# Patient Record
Sex: MALE | Race: OTHER | HISPANIC OR LATINO | Employment: FULL TIME | URBAN - METROPOLITAN AREA
[De-identification: names, ages, dates, MRNs, and addresses within clinical notes are randomized per-mention and may not be internally consistent; named-entity substitution may affect disease eponyms.]

---

## 2017-02-10 ENCOUNTER — ALLSCRIPTS OFFICE VISIT (OUTPATIENT)
Dept: OTHER | Facility: OTHER | Age: 57
End: 2017-02-10

## 2017-02-11 ENCOUNTER — GENERIC CONVERSION - ENCOUNTER (OUTPATIENT)
Dept: OTHER | Facility: OTHER | Age: 57
End: 2017-02-11

## 2017-02-20 LAB
ALBUMIN SERPL BCP-MCNC: 4.2 G/DL
ALP SERPL-CCNC: 72 U/L
ALT SERPL W P-5'-P-CCNC: 22 U/L
AST SERPL W P-5'-P-CCNC: 12 U/L
BILIRUB SERPL-MCNC: 0.5 MG/DL
BUN SERPL-MCNC: 20 MG/DL
CALCIUM SERPL-MCNC: 9.1 MG/DL
CHLORIDE SERPL-SCNC: 106 MMOL/L
CHOLEST SERPL-MCNC: 230 MG/DL
CHOLEST/HDLC SERPL: 5.61 {RATIO}
CO2 SERPL-SCNC: 28 MMOL/L
CREAT SERPL-MCNC: 0.86 MG/DL
DEPRECATED RDW RBC AUTO: 14.9 FL
GLUCOSE (HISTORICAL): 87
HCT VFR BLD AUTO: 48.3 %
HDLC SERPL-MCNC: 41 MG/DL
HGB BLD-MCNC: 16 G/DL
LDL CHOLESTEROL (HISTORICAL): 164
MCH RBC QN AUTO: 27.3 PG
MCHC RBC AUTO-ENTMCNC: 33.2 G/DL
MCV RBC AUTO: 82 FL
NON-HDL-CHOL (CHOL-HDL) (HISTORICAL): 189
PLATELET # BLD AUTO: 217 10*3/UL
PMV BLD AUTO: 10.1 FL
POTASSIUM SERPL-SCNC: 5.3 MMOL/L
RBC # BLD AUTO: 5.87 10*6/UL
SODIUM SERPL-SCNC: 142 MMOL/L
TOTAL PROTEIN (HISTORICAL): 7.2
TRIGL SERPL-MCNC: 124 MG/DL
TSH SERPL DL<=0.05 MIU/L-ACNC: 0.81 M[IU]/L
WBC # BLD AUTO: 10.5 10*3/UL

## 2017-04-03 ENCOUNTER — GENERIC CONVERSION - ENCOUNTER (OUTPATIENT)
Dept: OTHER | Facility: OTHER | Age: 57
End: 2017-04-03

## 2018-01-14 VITALS
WEIGHT: 157.19 LBS | TEMPERATURE: 98 F | HEIGHT: 62 IN | RESPIRATION RATE: 16 BRPM | OXYGEN SATURATION: 96 % | HEART RATE: 72 BPM | BODY MASS INDEX: 28.93 KG/M2 | DIASTOLIC BLOOD PRESSURE: 80 MMHG | SYSTOLIC BLOOD PRESSURE: 146 MMHG

## 2018-01-16 NOTE — RESULT NOTES
Message   Please call patient  His BW appeared stable except for his Cholesterol levels  All of his levels were very high  At this time, due to the high elevation and his Hx of TIA, He needs to start Cholesterol medication  I will call in a medication to his pharmacy  Pts primary language is [place], please have MA call patient that can communicate with him   thank you     Verified Results  (Q) CBC (INCLUDES DIFF/PLT) (REFL) [id number] 07:58AM Virgie Bond     Test Name Result Flag Reference   WHITE BLOOD CELL COUNT 8 9 Thousand/uL  3 8-10 8   RED BLOOD CELL COUNT 5 66 Million/uL  4 20-5 80   HEMOGLOBIN 15 0 g/dL  13 2-17 1   HEMATOCRIT 47 3 %  38 5-50 0   MCV 83 5 fL  80 0-100 0   MCH 26 5 pg L 27 0-33 0   MCHC 31 7 g/dL L 32 0-36 0   RDW 15 2 % H 11 0-15 0   PLATELET COUNT 762 Thousand/uL  140-400   MPV 10 6 fL  7 5-11 5   ABSOLUTE NEUTROPHILS 6150 cells/uL  0225-0564   ABSOLUTE LYMPHOCYTES 1994 cells/uL  850-3900   ABSOLUTE MONOCYTES 481 cells/uL  200-950   ABSOLUTE EOSINOPHILS 231 cells/uL     ABSOLUTE BASOPHILS 45 cells/uL  0-200   NEUTROPHILS 69 1 %     LYMPHOCYTES 22 4 %     MONOCYTES 5 4 %     EOSINOPHILS 2 6 %     BASOPHILS 0 5 %       (Q) COMPREHENSIVE METABOLIC PNL W/ADJUSTED CALCIUM [id number] 07:58AM Virgie Bond     Test Name Result Flag Reference   GLUCOSE 86 mg/dL  65-99   Fasting reference interval   UREA NITROGEN (BUN) 14 mg/dL  7-25   CREATININE 0 82 mg/dL  0 70-1 33   For patients >52years of age, the reference limit  for Creatinine is approximately 13% higher for people  identified as -American  eGFR NON-AFR   AMERICAN 98 mL/min/1 73m2  > OR = 60   eGFR AFRICAN AMERICAN 114 mL/min/1 73m2  > OR = 60   BUN/CREATININE RATIO   3-59   NOT APPLICABLE (calc)   SODIUM 138 mmol/L  135-146   POTASSIUM 4 9 mmol/L  3 5-5 3   CHLORIDE 104 mmol/L     CARBON DIOXIDE 26 mmol/L  19-30   CALCIUM 9 3 mg/dL  8 6-10 3   CALCIUM (ADJUSTED FOR$ALBUMIN) 9 5 mg/dL (calc)  8 6-10 2 PROTEIN, TOTAL 6 7 g/dL  6 1-8 1   ALBUMIN 4 1 g/dL  3 6-5 1   GLOBULIN 2 6 g/dL (calc)  1 9-3 7   ALBUMIN/GLOBULIN RATIO 1 6 (calc)  1 0-2 5   BILIRUBIN, TOTAL 0 5 mg/dL  0 2-1 2   ALKALINE PHOSPHATASE 75 U/L     AST 19 U/L  10-35   ALT 12 U/L  9-46     (Q) LIPID PANEL WITH REFLEX TO DIRECT LDL [id number] 07:58AM Virgie Bond     Test Name Result Flag Reference   CHOLESTEROL, TOTAL 276 mg/dL H 125-200   HDL CHOLESTEROL 34 mg/dL L > OR = 40   TRIGLICERIDES 320 mg/dL  <150   LDL-CHOLESTEROL 218 mg/dL (calc) H <130   LDL-C levels > or = 190 mg/dL may indicate familial   hypercholesterolemia (FH)  Clinical assessment and   measurement of blood lipid levels should be considered  for all first degree relatives of patients with an   FH diagnosis  J of Clinical Lipidology 5:S1-S8 2011  Desirable range <100 mg/dL for patients with CHD or  diabetes and <70 mg/dL for diabetic patients with  known heart disease  CHOL/HDLC RATIO 8 1 (calc) H < OR = 5 0   NON HDL CHOLESTEROL 242 mg/dL (calc) H    Target for non-HDL cholesterol is 30 mg/dL higher than   LDL cholesterol target       (Q) TSH, 3RD GENERATION W/REFLEX TO FT4 [id number] 07:58AM Virgie Bond     Test Name Result Flag Reference   TSH W/REFLEX TO FT4 0 62 mIU/L  0 40-4 50

## 2018-12-07 ENCOUNTER — OFFICE VISIT (OUTPATIENT)
Dept: FAMILY MEDICINE CLINIC | Facility: CLINIC | Age: 58
End: 2018-12-07
Payer: COMMERCIAL

## 2018-12-07 VITALS
SYSTOLIC BLOOD PRESSURE: 142 MMHG | HEART RATE: 85 BPM | TEMPERATURE: 97.9 F | OXYGEN SATURATION: 96 % | RESPIRATION RATE: 14 BRPM | HEIGHT: 64 IN | BODY MASS INDEX: 25.32 KG/M2 | WEIGHT: 148.3 LBS | DIASTOLIC BLOOD PRESSURE: 80 MMHG

## 2018-12-07 DIAGNOSIS — N50.3 CYST OF EPIDIDYMIS: Primary | ICD-10-CM

## 2018-12-07 PROCEDURE — 99213 OFFICE O/P EST LOW 20 MIN: CPT | Performed by: FAMILY MEDICINE

## 2018-12-07 PROCEDURE — 3008F BODY MASS INDEX DOCD: CPT | Performed by: FAMILY MEDICINE

## 2018-12-07 RX ORDER — ATORVASTATIN CALCIUM 80 MG/1
80 TABLET, FILM COATED ORAL
COMMUNITY
End: 2020-01-08 | Stop reason: SDUPTHER

## 2018-12-07 RX ORDER — SIMVASTATIN 40 MG
1 TABLET ORAL
COMMUNITY
Start: 2016-02-11 | End: 2019-02-26 | Stop reason: ALTCHOICE

## 2018-12-07 RX ORDER — CYANOCOBALAMIN (VITAMIN B-12) 2000 MCG
1 TABLET ORAL DAILY
COMMUNITY
Start: 2016-08-05

## 2018-12-07 NOTE — ASSESSMENT & PLAN NOTE
Patient has a ?mass on his R testicle x a few years  In the past few mos, mass has increased in size and become painful  No pain w/ urination  No fevers/chills  Upon examination, patient exhibits soft mildly tender mass behind right testicle  Lesion is most likely a benign epididymal appendage  But I would like to confirm with scrotal ultrasound    Will call with results

## 2018-12-11 ENCOUNTER — HOSPITAL ENCOUNTER (OUTPATIENT)
Dept: ULTRASOUND IMAGING | Facility: MEDICAL CENTER | Age: 58
Discharge: HOME | End: 2018-12-11
Payer: COMMERCIAL

## 2018-12-11 DIAGNOSIS — N50.3 CYST OF EPIDIDYMIS: ICD-10-CM

## 2018-12-11 PROCEDURE — 76870 US EXAM SCROTUM: CPT

## 2018-12-20 ENCOUNTER — TELEPHONE (OUTPATIENT)
Dept: FAMILY MEDICINE CLINIC | Facility: CLINIC | Age: 58
End: 2018-12-20

## 2018-12-20 NOTE — TELEPHONE ENCOUNTER
Pt's niece called into the office and stated that pt would like a referral to a Neurologist  I did advise the the niece that you are out of the office till next week, and we will get back to her once we hear from you that the referral was placed  Thank you!

## 2018-12-20 NOTE — TELEPHONE ENCOUNTER
Call niece, does her uncle want a referral to a neurologist? (because I think she means a urologist)  Please clarify

## 2018-12-24 DIAGNOSIS — N50.3 CYST OF EPIDIDYMIS: Primary | ICD-10-CM

## 2018-12-28 NOTE — TELEPHONE ENCOUNTER
LM on pt's [facility] cellphone (consent ok) with Urology information   Will be mailing referral

## 2019-01-04 ENCOUNTER — TELEPHONE (OUTPATIENT)
Dept: FAMILY MEDICINE CLINIC | Facility: CLINIC | Age: 59
End: 2019-01-04

## 2019-01-04 NOTE — TELEPHONE ENCOUNTER
Pt's niece called, pt got a urology referral to a Dr in [place]  Is there someone closer? Call Aidan Herbert at [phone]

## 2019-01-11 ENCOUNTER — TELEPHONE (OUTPATIENT)
Dept: UROLOGY | Facility: MEDICAL CENTER | Age: 59
End: 2019-01-11

## 2019-01-11 NOTE — TELEPHONE ENCOUNTER
Reason for appointment/Complaint/Diagnosis : Cyst of epididymis     Insurance:     History of Cancer? PE                     If yes, what kind? N/A    Previous urologist?    No                  Records requested/where? No    Outside testing/where? No    Location Preference for office visit?  [place]

## 2019-01-15 ENCOUNTER — OFFICE VISIT (OUTPATIENT)
Dept: UROLOGY | Facility: MEDICAL CENTER | Age: 59
End: 2019-01-15
Payer: COMMERCIAL

## 2019-01-15 VITALS
HEART RATE: 82 BPM | SYSTOLIC BLOOD PRESSURE: 136 MMHG | BODY MASS INDEX: 28.34 KG/M2 | DIASTOLIC BLOOD PRESSURE: 82 MMHG | HEIGHT: 62 IN | WEIGHT: 154 LBS

## 2019-01-15 DIAGNOSIS — N40.0 BPH WITHOUT OBSTRUCTION/LOWER URINARY TRACT SYMPTOMS: ICD-10-CM

## 2019-01-15 DIAGNOSIS — N50.3 CYST OF EPIDIDYMIS: Primary | Chronic | ICD-10-CM

## 2019-01-15 PROCEDURE — 99244 OFF/OP CNSLTJ NEW/EST MOD 40: CPT | Performed by: UROLOGY

## 2019-01-15 NOTE — PROGRESS NOTES
Assessment/Plan:  1  Right-sided epididymal cysts-no intervention necessary at this time  2  BPH without obstruction-check PSA and CO MP  No problem-specific Assessment & Plan notes found for this encounter  Diagnoses and all orders for this visit:    Cyst of epididymis  Comments:  Right-sided    BPH without obstruction/lower urinary tract symptoms  -     PSA Total, Diagnostic; Future  -     Comprehensive metabolic panel; Future          Subjective:      Patient ID: Sae Mcgraw is a 59 y o  male  Chief complaint:  Epididymal cysts on right    History of present illness:  68-year-old  gentleman who presents with a mass involving his right epididymis  Patient underwent testicular ultrasound ordered by his PCP which revealed multiple right epididymal cysts  The cysts are not tender nor growing quickly at the present time their classified is asymptomatic  With regard to the patient's voiding pattern he notes never having had a PSA or prostate examination  We did discuss PSA screening and the patient agrees to this in view of current AUA guidelines  The patient denies dysuria frequency urgency incontinence an appreciable nocturia  The following portions of the patient's history were reviewed and updated as appropriate: allergies, current medications, past family history, past medical history, past social history, past surgical history and problem list     Review of Systems   All other systems reviewed and are negative  Objective:      /82 (BP Location: Left arm, Patient Position: Sitting, Cuff Size: Adult)   Pulse 82   Ht 5' 2" (1 575 m)   Wt 69 9 kg (154 lb)   BMI 28 17 kg/m²          Physical Exam   Constitutional: He appears well-developed and well-nourished  HENT:   Head: Normocephalic and atraumatic  Eyes: Conjunctivae and EOM are normal    Neck: Normal range of motion  Neck supple  Pulmonary/Chest: Effort normal  No respiratory distress   He has no wheezes  Abdominal: Soft  He exhibits no distension  There is no tenderness  Genitourinary:   Genitourinary Comments: Phallus normal uncircumcised without cutaneous lesions  Meatus patent normally placed  Digital rectal examination revealed a normal anal verge normal anal sphincter tone no palpable rectal masses and a 20 g a nodular nontender prostate  Left hemiscrotum was normal with normal testicle and adnexa without masses or tenderness  The right hemiscrotum reveals a normal right testis without masses or tenderness  The the right epididymis is full and is palpably consistent with multiple epididymal cysts as seen on ultrasound  Vitals reviewed

## 2019-01-15 NOTE — LETTER
January 15, 2019     Yuri Giron DO  [address] [facility]  [address] [facility]    Patient: Lorenza Holguin   YOB: 1960   Date of Visit: 1/15/2019       Dear Dr Corey Chen:    Thank you for referring Lorenza Holguin to me for evaluation  Below are my notes for this consultation  If you have questions, please do not hesitate to call me  I look forward to following your patient along with you  Sincerely,        Lucia Whitley MD        CC: No Recipients  Lucia Whitley MD  1/15/2019  3:20 PM  Sign at close encounter  Assessment/Plan:  1  Right-sided epididymal cysts-no intervention necessary at this time  2  BPH without obstruction-check PSA and CO MP  No problem-specific Assessment & Plan notes found for this encounter  Diagnoses and all orders for this visit:    Cyst of epididymis  Comments:  Right-sided    BPH without obstruction/lower urinary tract symptoms  -     PSA Total, Diagnostic; Future  -     Comprehensive metabolic panel; Future          Subjective:      Patient ID: Lorenza Holguin is a 59 y o  male  Chief complaint:  Epididymal cysts on right    History of present illness:  23-year-old  gentleman who presents with a mass involving his right epididymis  Patient underwent testicular ultrasound ordered by his PCP which revealed multiple right epididymal cysts  The cysts are not tender nor growing quickly at the present time their classified is asymptomatic  With regard to the patient's voiding pattern he notes never having had a PSA or prostate examination  We did discuss PSA screening and the patient agrees to this in view of current AUA guidelines  The patient denies dysuria frequency urgency incontinence an appreciable nocturia          The following portions of the patient's history were reviewed and updated as appropriate: allergies, current medications, past family history, past medical history, past social history, past surgical history and problem list     Review of Systems   All other systems reviewed and are negative  Objective:      /82 (BP Location: Left arm, Patient Position: Sitting, Cuff Size: Adult)   Pulse 82   Ht 5' 2" (1 575 m)   Wt 69 9 kg (154 lb)   BMI 28 17 kg/m²           Physical Exam   Constitutional: He appears well-developed and well-nourished  HENT:   Head: Normocephalic and atraumatic  Eyes: Conjunctivae and EOM are normal    Neck: Normal range of motion  Neck supple  Pulmonary/Chest: Effort normal  No respiratory distress  He has no wheezes  Abdominal: Soft  He exhibits no distension  There is no tenderness  Genitourinary:   Genitourinary Comments: Phallus normal uncircumcised without cutaneous lesions  Meatus patent normally placed  Digital rectal examination revealed a normal anal verge normal anal sphincter tone no palpable rectal masses and a 20 g a nodular nontender prostate  Left hemiscrotum was normal with normal testicle and adnexa without masses or tenderness  The right hemiscrotum reveals a normal right testis without masses or tenderness  The the right epididymis is full and is palpably consistent with multiple epididymal cysts as seen on ultrasound  Vitals reviewed

## 2019-01-26 ENCOUNTER — APPOINTMENT (OUTPATIENT)
Dept: LAB | Facility: MEDICAL CENTER | Age: 59
End: 2019-01-26
Attending: UROLOGY
Payer: COMMERCIAL

## 2019-01-26 DIAGNOSIS — N40.0 BPH WITHOUT OBSTRUCTION/LOWER URINARY TRACT SYMPTOMS: ICD-10-CM

## 2019-01-26 LAB
ALBUMIN SERPL BCP-MCNC: 4.3 G/DL (ref 3.5–5)
ALP SERPL-CCNC: 83 U/L (ref 46–116)
ALT SERPL W P-5'-P-CCNC: 26 U/L (ref 12–78)
ANION GAP SERPL CALCULATED.3IONS-SCNC: 5 MMOL/L (ref 4–13)
AST SERPL W P-5'-P-CCNC: 16 U/L (ref 5–45)
BILIRUB SERPL-MCNC: 0.26 MG/DL (ref 0.2–1)
BUN SERPL-MCNC: 15 MG/DL (ref 5–25)
CALCIUM SERPL-MCNC: 8.8 MG/DL (ref 8.3–10.1)
CHLORIDE SERPL-SCNC: 107 MMOL/L (ref 100–108)
CO2 SERPL-SCNC: 26 MMOL/L (ref 21–32)
CREAT SERPL-MCNC: 0.83 MG/DL (ref 0.6–1.3)
GFR SERPL CREATININE-BSD FRML MDRD: 96 ML/MIN/1.73SQ M
GLUCOSE P FAST SERPL-MCNC: 101 MG/DL (ref 65–99)
POTASSIUM SERPL-SCNC: 4.3 MMOL/L (ref 3.5–5.3)
PROT SERPL-MCNC: 7.7 G/DL (ref 6.4–8.2)
PSA SERPL-MCNC: 1.4 NG/ML (ref 0–4)
SODIUM SERPL-SCNC: 138 MMOL/L (ref 136–145)

## 2019-01-26 PROCEDURE — 84153 ASSAY OF PSA TOTAL: CPT

## 2019-01-26 PROCEDURE — 80053 COMPREHEN METABOLIC PANEL: CPT

## 2019-01-26 PROCEDURE — 36415 COLL VENOUS BLD VENIPUNCTURE: CPT

## 2019-02-26 ENCOUNTER — OFFICE VISIT (OUTPATIENT)
Dept: UROLOGY | Facility: MEDICAL CENTER | Age: 59
End: 2019-02-26
Payer: COMMERCIAL

## 2019-02-26 VITALS
WEIGHT: 154 LBS | SYSTOLIC BLOOD PRESSURE: 152 MMHG | DIASTOLIC BLOOD PRESSURE: 82 MMHG | HEIGHT: 62 IN | BODY MASS INDEX: 28.34 KG/M2 | HEART RATE: 91 BPM

## 2019-02-26 DIAGNOSIS — N50.3 CYST OF EPIDIDYMIS: Primary | ICD-10-CM

## 2019-02-26 DIAGNOSIS — N40.0 BENIGN PROSTATIC HYPERPLASIA WITHOUT LOWER URINARY TRACT SYMPTOMS: ICD-10-CM

## 2019-02-26 LAB
SL AMB  POCT GLUCOSE, UA: NORMAL
SL AMB LEUKOCYTE ESTERASE,UA: NORMAL
SL AMB POCT BILIRUBIN,UA: NORMAL
SL AMB POCT BLOOD,UA: NORMAL
SL AMB POCT CLARITY,UA: CLEAR
SL AMB POCT COLOR,UA: YELLOW
SL AMB POCT KETONES,UA: NORMAL
SL AMB POCT NITRITE,UA: NORMAL
SL AMB POCT PH,UA: 8
SL AMB POCT SPECIFIC GRAVITY,UA: 1.02
SL AMB POCT URINE PROTEIN: NORMAL
SL AMB POCT UROBILINOGEN: 1

## 2019-02-26 PROCEDURE — 99214 OFFICE O/P EST MOD 30 MIN: CPT | Performed by: UROLOGY

## 2019-02-26 PROCEDURE — 81003 URINALYSIS AUTO W/O SCOPE: CPT | Performed by: UROLOGY

## 2019-02-26 NOTE — PROGRESS NOTES
Assessment/Plan:  1  BPH without obstructive symptoms-repeat PSA and AMBER in 1 year  2  Right epididymal cysts-no intervention at this time  No problem-specific Assessment & Plan notes found for this encounter  Diagnoses and all orders for this visit:    Cyst of epididymis    Benign prostatic hyperplasia without lower urinary tract symptoms  -     POCT urine dip auto non-scope  -     PSA Total, Diagnostic; Future  -     Comprehensive metabolic panel; Future          Subjective:      Patient ID: Milton Pollard is a 59 y o  male  Chief complaint:  Prostate examination and discussion of epididymal cyst    History of present illness: This is a 76-year-old male who presented previously for evaluation of a right epididymal cyst   The patient has no evidence of solid epididymal or testicular masses  He is voiding adequately with AUA symptom score of only 3  The patient denies dysuria obstructive irritative voiding symptoms significant urinary frequency nocturia urgency or incontinence  The patient's PSA is well within normal limits a 1 4  AMBER performed last time revealed no suspicion of prostatic      The following portions of the patient's history were reviewed and updated as appropriate: allergies, current medications, past family history, past medical history, past social history, past surgical history and problem list     Review of Systems   Constitutional: Negative  Respiratory: Negative  Cardiovascular: Negative  Genitourinary: Negative  Musculoskeletal: Negative  Allergic/Immunologic: Negative  Neurological: Negative  Hematological: Negative  Psychiatric/Behavioral: Negative  Objective:      /82   Pulse 91   Ht 5' 2" (1 575 m)   Wt 69 9 kg (154 lb)   BMI 28 17 kg/m²          Physical Exam   Constitutional: He is oriented to person, place, and time  He appears well-nourished  No distress  HENT:   Head: Atraumatic  Neck: Neck supple  Pulmonary/Chest: Effort normal  No respiratory distress  Abdominal: Soft  Neurological: He is alert and oriented to person, place, and time  Psychiatric: He has a normal mood and affect  His behavior is normal  Judgment and thought content normal    Vitals reviewed

## 2019-12-09 ENCOUNTER — TELEPHONE (OUTPATIENT)
Dept: FAMILY MEDICINE CLINIC | Facility: CLINIC | Age: 59
End: 2019-12-09

## 2019-12-26 ENCOUNTER — OFFICE VISIT (OUTPATIENT)
Dept: FAMILY MEDICINE CLINIC | Facility: CLINIC | Age: 59
End: 2019-12-26
Payer: COMMERCIAL

## 2019-12-26 ENCOUNTER — APPOINTMENT (OUTPATIENT)
Dept: RADIOLOGY | Facility: CLINIC | Age: 59
End: 2019-12-26
Payer: COMMERCIAL

## 2019-12-26 VITALS
BODY MASS INDEX: 24.8 KG/M2 | HEART RATE: 83 BPM | WEIGHT: 140 LBS | HEIGHT: 63 IN | RESPIRATION RATE: 17 BRPM | SYSTOLIC BLOOD PRESSURE: 136 MMHG | TEMPERATURE: 97.8 F | DIASTOLIC BLOOD PRESSURE: 70 MMHG | OXYGEN SATURATION: 97 %

## 2019-12-26 DIAGNOSIS — Z12.11 SCREENING FOR COLON CANCER: ICD-10-CM

## 2019-12-26 DIAGNOSIS — M25.541 ARTHRALGIA OF BOTH HANDS: ICD-10-CM

## 2019-12-26 DIAGNOSIS — M54.6 ACUTE LEFT-SIDED THORACIC BACK PAIN: ICD-10-CM

## 2019-12-26 DIAGNOSIS — Z13.1 SCREENING FOR DIABETES MELLITUS: ICD-10-CM

## 2019-12-26 DIAGNOSIS — Z13.0 SCREENING FOR DEFICIENCY ANEMIA: ICD-10-CM

## 2019-12-26 DIAGNOSIS — M25.542 ARTHRALGIA OF BOTH HANDS: ICD-10-CM

## 2019-12-26 DIAGNOSIS — M54.6 ACUTE LEFT-SIDED THORACIC BACK PAIN: Primary | ICD-10-CM

## 2019-12-26 DIAGNOSIS — Z13.29 SCREENING FOR THYROID DISORDER: ICD-10-CM

## 2019-12-26 DIAGNOSIS — M54.2 CHRONIC NECK PAIN: ICD-10-CM

## 2019-12-26 DIAGNOSIS — G89.29 CHRONIC NECK PAIN: ICD-10-CM

## 2019-12-26 DIAGNOSIS — Z13.220 SCREENING FOR CHOLESTEROL LEVEL: ICD-10-CM

## 2019-12-26 PROCEDURE — 72072 X-RAY EXAM THORAC SPINE 3VWS: CPT

## 2019-12-26 PROCEDURE — 3008F BODY MASS INDEX DOCD: CPT | Performed by: FAMILY MEDICINE

## 2019-12-26 PROCEDURE — 99214 OFFICE O/P EST MOD 30 MIN: CPT | Performed by: FAMILY MEDICINE

## 2019-12-26 PROCEDURE — 72050 X-RAY EXAM NECK SPINE 4/5VWS: CPT

## 2019-12-26 PROCEDURE — 73130 X-RAY EXAM OF HAND: CPT

## 2019-12-26 RX ORDER — MELOXICAM 15 MG/1
15 TABLET ORAL DAILY
Qty: 30 TABLET | Refills: 0 | Status: SHIPPED | OUTPATIENT
Start: 2019-12-26 | End: 2020-01-08 | Stop reason: SDUPTHER

## 2019-12-26 NOTE — ASSESSMENT & PLAN NOTE
Also, b/l hand pain  Morning stiffness x 1 year  No other joint pains  Will sent for x-rays bilateral hands  Will also order arthritis lab panel    Will call with results

## 2019-12-26 NOTE — PROGRESS NOTES
[facility]      NAME: Dania Hubbard  AGE: 62 y o  SEX: male  : 1960   MRN: [id number]    DATE: 2019  TIME: 4:32 PM    Assessment and Plan     Problem List Items Addressed This Visit     Acute left-sided thoracic back pain - Primary     3 week hx of L thoracic back pain  No radiation  Exam today reveals point tenderness left paravertebral musculature  Will sent for x-rays of thoracic spine  Rx given for meloxicam 15 mg daily as needed  Will call with results  Possible referral to physical therapy             Relevant Medications    meloxicam (MOBIC) 15 mg tablet    Other Relevant Orders    XR spine thoracic 3 vw    Chronic neck pain     Also, L sided neck pain radiating into L arm x mos  No injuries  No prior history of neck problems  Patient works manual labor  Will sent for x-rays of cervical spine  Rx given for meloxicam 15 mg daily as needed  Call with results  Possible referral to physical therapy or advanced imaging  Relevant Medications    meloxicam (MOBIC) 15 mg tablet    Other Relevant Orders    XR spine cervical complete 4 or 5 vw non injury    Arthralgia of both hands     Also, b/l hand pain  Morning stiffness x 1 year  No other joint pains  Will sent for x-rays bilateral hands  Will also order arthritis lab panel    Will call with results             Relevant Medications    meloxicam (MOBIC) 15 mg tablet    Other Relevant Orders    XR hand 3+ vw right    XR hand 3+ vw left    MAXIMILIANO Screen w/ Reflex to Titer/Pattern    RF Screen w/ Reflex to Titer    Lyme Antibody Profile with reflex to WB    C-reactive protein    Sedimentation rate, automated      Other Visit Diagnoses     Screening for colon cancer        Relevant Orders    Ambulatory referral to Gastroenterology    Screening for deficiency anemia        Relevant Orders    CBC and differential    Screening for diabetes mellitus        Relevant Orders    Comprehensive metabolic panel Screening for cholesterol level        Relevant Orders    Lipid Panel with Direct LDL reflex    Screening for thyroid disorder        Relevant Orders    TSH, 3rd generation with Free T4 reflex              Return to office in:  Will call with arthritis lab panel along with x-ray results of cervical, thoracic, hand x-rays  NOTE:   Patient is overdue for routine labs as well  Will place orders for these as well, except for PSA (which is being ordered by his urologist )  Chief Complaint     Chief Complaint   Patient presents with    Body pain     and back pain x 3 weeks  Pt has been taking Tylenol with little relief       History of Present Illness     3 week hx of L thoracic back pain  No radiation    Also, L sided neck pain radiating into L arm x mos  No injuries  Also, b/l hand pain  Morning stiffness x 1 year      The following portions of the patient's history were reviewed and updated as appropriate: allergies, current medications, past family history, past medical history, past social history, past surgical history and problem list     Review of Systems   Review of Systems   Respiratory: Negative  Cardiovascular: Negative  Gastrointestinal: Negative  Genitourinary: Negative  Musculoskeletal: Positive for arthralgias, back pain and neck stiffness         Active Problem List     Patient Active Problem List   Diagnosis    Cyst of epididymis    Acute left-sided thoracic back pain    Chronic neck pain    Arthralgia of both hands       Objective   /70 (BP Location: Left arm, Patient Position: Sitting, Cuff Size: Adult)   Pulse 83   Temp 97 8 °F (36 6 °C) (Tympanic)   Resp 17   Ht 5' 2 6" (1 59 m)   Wt 63 5 kg (140 lb)   SpO2 97%   BMI 25 12 kg/m²     Physical Exam    Pertinent Laboratory/Diagnostic Studies:  none    Current Medications     Current Outpatient Medications:     aspirin 81 MG tablet, Take 1 tablet by mouth daily, Disp: , Rfl:     atorvastatin (LIPITOR) 80 mg tablet, Take 80 mg by mouth, Disp: , Rfl:     vitamin B-12 (CYANOCOBALAMIN) 2000 MCG TABS, Take 1 tablet by mouth daily, Disp: , Rfl:     meloxicam (MOBIC) 15 mg tablet, Take 1 tablet (15 mg total) by mouth daily, Disp: 30 tablet, Rfl: 0    Health Maintenance     Health Maintenance   Topic Date Due    Hepatitis C Screening  1960    CRC Screening: Colonoscopy  1960    BMI: Followup Plan  07/07/1976    HIV Screening  12/27/2019 (Originally 7/7/1973)    Influenza Vaccine  01/22/2020 (Originally 7/1/2019)    Pneumococcal Vaccine: Pediatrics (0 to 5 Years) and At-Risk Patients (6 to 59 Years) (1 of 1 - PPSV23) 06/26/2020 (Originally 7/7/1964)    Depression Screening PHQ  12/26/2020    BMI: Adult  12/26/2020    Pneumococcal Vaccine: 65+ Years (1 of 2 - PCV13) 07/07/2023    DTaP,Tdap,and Td Vaccines (2 - Td) 07/02/2025    HIB Vaccine  Aged Out    Hepatitis B Vaccine  Aged Out    IPV Vaccine  Aged Out    Hepatitis A Vaccine  Aged Out    Meningococcal ACWY Vaccine  Aged Out    HPV Vaccine  Aged Out     Immunization History   Administered Date(s) Administered    Tdap 07/02/2015       DO Te[facility]

## 2019-12-26 NOTE — ASSESSMENT & PLAN NOTE
Also, L sided neck pain radiating into L arm x mos  No injuries  No prior history of neck problems  Patient works manual labor  Will sent for x-rays of cervical spine  Rx given for meloxicam 15 mg daily as needed  Call with results  Possible referral to physical therapy or advanced imaging

## 2019-12-26 NOTE — ASSESSMENT & PLAN NOTE
3 week hx of L thoracic back pain  No radiation  Exam today reveals point tenderness left paravertebral musculature  Will sent for x-rays of thoracic spine  Rx given for meloxicam 15 mg daily as needed  Will call with results    Possible referral to physical therapy

## 2019-12-28 ENCOUNTER — APPOINTMENT (OUTPATIENT)
Dept: LAB | Facility: CLINIC | Age: 59
End: 2019-12-28
Payer: COMMERCIAL

## 2019-12-28 DIAGNOSIS — M25.541 ARTHRALGIA OF BOTH HANDS: ICD-10-CM

## 2019-12-28 DIAGNOSIS — Z13.220 SCREENING FOR CHOLESTEROL LEVEL: ICD-10-CM

## 2019-12-28 DIAGNOSIS — Z13.29 SCREENING FOR THYROID DISORDER: ICD-10-CM

## 2019-12-28 DIAGNOSIS — Z13.1 SCREENING FOR DIABETES MELLITUS: ICD-10-CM

## 2019-12-28 DIAGNOSIS — Z13.0 SCREENING FOR DEFICIENCY ANEMIA: ICD-10-CM

## 2019-12-28 DIAGNOSIS — M25.542 ARTHRALGIA OF BOTH HANDS: ICD-10-CM

## 2019-12-28 LAB
ALBUMIN SERPL BCP-MCNC: 4 G/DL (ref 3.5–5)
ALP SERPL-CCNC: 73 U/L (ref 46–116)
ALT SERPL W P-5'-P-CCNC: 28 U/L (ref 12–78)
ANION GAP SERPL CALCULATED.3IONS-SCNC: 5 MMOL/L (ref 4–13)
AST SERPL W P-5'-P-CCNC: 22 U/L (ref 5–45)
BASOPHILS # BLD AUTO: 0.08 THOUSANDS/ΜL (ref 0–0.1)
BASOPHILS NFR BLD AUTO: 1 % (ref 0–1)
BILIRUB SERPL-MCNC: 0.37 MG/DL (ref 0.2–1)
BUN SERPL-MCNC: 21 MG/DL (ref 5–25)
CALCIUM SERPL-MCNC: 9.5 MG/DL (ref 8.3–10.1)
CHLORIDE SERPL-SCNC: 108 MMOL/L (ref 100–108)
CHOLEST SERPL-MCNC: 281 MG/DL (ref 50–200)
CO2 SERPL-SCNC: 26 MMOL/L (ref 21–32)
CREAT SERPL-MCNC: 0.8 MG/DL (ref 0.6–1.3)
CRP SERPL QL: 10.9 MG/L
EOSINOPHIL # BLD AUTO: 0.29 THOUSAND/ΜL (ref 0–0.61)
EOSINOPHIL NFR BLD AUTO: 3 % (ref 0–6)
ERYTHROCYTE [DISTWIDTH] IN BLOOD BY AUTOMATED COUNT: 16.1 % (ref 11.6–15.1)
ERYTHROCYTE [SEDIMENTATION RATE] IN BLOOD: 8 MM/HOUR (ref 0–10)
GFR SERPL CREATININE-BSD FRML MDRD: 96 ML/MIN/1.73SQ M
GLUCOSE P FAST SERPL-MCNC: 80 MG/DL (ref 65–99)
HCT VFR BLD AUTO: 49.7 % (ref 36.5–49.3)
HDLC SERPL-MCNC: 35 MG/DL
HGB BLD-MCNC: 15.7 G/DL (ref 12–17)
IMM GRANULOCYTES # BLD AUTO: 0.03 THOUSAND/UL (ref 0–0.2)
IMM GRANULOCYTES NFR BLD AUTO: 0 % (ref 0–2)
LDLC SERPL CALC-MCNC: 222 MG/DL (ref 0–100)
LYMPHOCYTES # BLD AUTO: 1.58 THOUSANDS/ΜL (ref 0.6–4.47)
LYMPHOCYTES NFR BLD AUTO: 15 % (ref 14–44)
MCH RBC QN AUTO: 27.4 PG (ref 26.8–34.3)
MCHC RBC AUTO-ENTMCNC: 31.6 G/DL (ref 31.4–37.4)
MCV RBC AUTO: 87 FL (ref 82–98)
MONOCYTES # BLD AUTO: 0.71 THOUSAND/ΜL (ref 0.17–1.22)
MONOCYTES NFR BLD AUTO: 7 % (ref 4–12)
NEUTROPHILS # BLD AUTO: 7.69 THOUSANDS/ΜL (ref 1.85–7.62)
NEUTS SEG NFR BLD AUTO: 74 % (ref 43–75)
NRBC BLD AUTO-RTO: 0 /100 WBCS
PLATELET # BLD AUTO: 234 THOUSANDS/UL (ref 149–390)
PMV BLD AUTO: 11.8 FL (ref 8.9–12.7)
POTASSIUM SERPL-SCNC: 4.3 MMOL/L (ref 3.5–5.3)
PROT SERPL-MCNC: 7.6 G/DL (ref 6.4–8.2)
RBC # BLD AUTO: 5.74 MILLION/UL (ref 3.88–5.62)
SODIUM SERPL-SCNC: 139 MMOL/L (ref 136–145)
TRIGL SERPL-MCNC: 121 MG/DL
TSH SERPL DL<=0.05 MIU/L-ACNC: 2.15 UIU/ML (ref 0.36–3.74)
WBC # BLD AUTO: 10.38 THOUSAND/UL (ref 4.31–10.16)

## 2019-12-28 PROCEDURE — 86430 RHEUMATOID FACTOR TEST QUAL: CPT

## 2019-12-28 PROCEDURE — 86617 LYME DISEASE ANTIBODY: CPT

## 2019-12-28 PROCEDURE — 85652 RBC SED RATE AUTOMATED: CPT

## 2019-12-28 PROCEDURE — 80053 COMPREHEN METABOLIC PANEL: CPT

## 2019-12-28 PROCEDURE — 86140 C-REACTIVE PROTEIN: CPT

## 2019-12-28 PROCEDURE — 86038 ANTINUCLEAR ANTIBODIES: CPT

## 2019-12-28 PROCEDURE — 80061 LIPID PANEL: CPT

## 2019-12-28 PROCEDURE — 85025 COMPLETE CBC W/AUTO DIFF WBC: CPT

## 2019-12-28 PROCEDURE — 36415 COLL VENOUS BLD VENIPUNCTURE: CPT

## 2019-12-28 PROCEDURE — 84443 ASSAY THYROID STIM HORMONE: CPT

## 2019-12-28 PROCEDURE — 86618 LYME DISEASE ANTIBODY: CPT

## 2019-12-30 DIAGNOSIS — M25.542 ARTHRALGIA OF BOTH HANDS: Primary | ICD-10-CM

## 2019-12-30 DIAGNOSIS — M25.541 ARTHRALGIA OF BOTH HANDS: Primary | ICD-10-CM

## 2019-12-30 LAB
RHEUMATOID FACT SER QL LA: NEGATIVE
RYE IGE QN: NEGATIVE

## 2019-12-31 LAB
B BURGDOR IGG PATRN SER IB-IMP: NEGATIVE
B BURGDOR IGG+IGM SER-ACNC: 1.76 ISR (ref 0–0.9)
B BURGDOR IGM PATRN SER IB-IMP: NEGATIVE
B BURGDOR18KD IGG SER QL IB: PRESENT
B BURGDOR23KD IGG SER QL IB: ABNORMAL
B BURGDOR23KD IGM SER QL IB: PRESENT
B BURGDOR28KD IGG SER QL IB: ABNORMAL
B BURGDOR30KD IGG SER QL IB: ABNORMAL
B BURGDOR39KD IGG SER QL IB: PRESENT
B BURGDOR39KD IGM SER QL IB: ABNORMAL
B BURGDOR41KD IGG SER QL IB: PRESENT
B BURGDOR41KD IGM SER QL IB: ABNORMAL
B BURGDOR45KD IGG SER QL IB: ABNORMAL
B BURGDOR58KD IGG SER QL IB: PRESENT
B BURGDOR66KD IGG SER QL IB: ABNORMAL
B BURGDOR93KD IGG SER QL IB: ABNORMAL

## 2020-01-08 DIAGNOSIS — M25.541 ARTHRALGIA OF BOTH HANDS: ICD-10-CM

## 2020-01-08 DIAGNOSIS — M54.2 CHRONIC NECK PAIN: ICD-10-CM

## 2020-01-08 DIAGNOSIS — M54.6 ACUTE LEFT-SIDED THORACIC BACK PAIN: ICD-10-CM

## 2020-01-08 DIAGNOSIS — E78.5 HYPERLIPIDEMIA, UNSPECIFIED HYPERLIPIDEMIA TYPE: Primary | ICD-10-CM

## 2020-01-08 DIAGNOSIS — A69.20 LYME DISEASE: Primary | ICD-10-CM

## 2020-01-08 DIAGNOSIS — G89.29 CHRONIC NECK PAIN: ICD-10-CM

## 2020-01-08 DIAGNOSIS — M25.542 ARTHRALGIA OF BOTH HANDS: ICD-10-CM

## 2020-01-08 RX ORDER — ATORVASTATIN CALCIUM 80 MG/1
80 TABLET, FILM COATED ORAL DAILY
Qty: 90 TABLET | Refills: 0 | Status: SHIPPED | OUTPATIENT
Start: 2020-01-08 | End: 2020-04-07

## 2020-01-08 RX ORDER — MELOXICAM 15 MG/1
15 TABLET ORAL DAILY
Qty: 30 TABLET | Refills: 0 | Status: SHIPPED | OUTPATIENT
Start: 2020-01-08 | End: 2020-04-16

## 2020-01-08 RX ORDER — DOXYCYCLINE HYCLATE 100 MG/1
100 CAPSULE ORAL EVERY 12 HOURS SCHEDULED
Qty: 42 CAPSULE | Refills: 0 | Status: SHIPPED | OUTPATIENT
Start: 2020-01-08 | End: 2020-01-29

## 2020-01-08 NOTE — TELEPHONE ENCOUNTER
She would like antibiotics for Lyme called into [facility]  She mentions that he needs medication for cholesterol- he has taken Lipitor previously  Would you like to continue the same dose based on recent labs? Needs rx for Meloxicam as well, last was printed, not e-scribe

## 2020-01-29 DIAGNOSIS — A69.20 LYME DISEASE: ICD-10-CM

## 2020-01-29 RX ORDER — DOXYCYCLINE HYCLATE 100 MG/1
100 CAPSULE ORAL EVERY 12 HOURS SCHEDULED
Qty: 42 CAPSULE | Refills: 0 | OUTPATIENT
Start: 2020-01-29 | End: 2020-02-19

## 2020-02-03 PROBLEM — A69.20 LYME DISEASE: Status: ACTIVE | Noted: 2020-02-03

## 2020-02-05 ENCOUNTER — OFFICE VISIT (OUTPATIENT)
Dept: FAMILY MEDICINE CLINIC | Facility: CLINIC | Age: 60
End: 2020-02-05
Payer: COMMERCIAL

## 2020-02-05 VITALS
SYSTOLIC BLOOD PRESSURE: 134 MMHG | HEART RATE: 95 BPM | BODY MASS INDEX: 26.2 KG/M2 | DIASTOLIC BLOOD PRESSURE: 80 MMHG | TEMPERATURE: 97.9 F | OXYGEN SATURATION: 97 % | RESPIRATION RATE: 17 BRPM | WEIGHT: 146 LBS

## 2020-02-05 DIAGNOSIS — M25.541 ARTHRALGIA OF BOTH HANDS: ICD-10-CM

## 2020-02-05 DIAGNOSIS — M25.542 ARTHRALGIA OF BOTH HANDS: ICD-10-CM

## 2020-02-05 DIAGNOSIS — A69.20 LYME DISEASE: Primary | ICD-10-CM

## 2020-02-05 DIAGNOSIS — G89.29 CHRONIC NECK PAIN: ICD-10-CM

## 2020-02-05 DIAGNOSIS — M54.6 ACUTE LEFT-SIDED THORACIC BACK PAIN: ICD-10-CM

## 2020-02-05 DIAGNOSIS — M54.2 CHRONIC NECK PAIN: ICD-10-CM

## 2020-02-05 PROCEDURE — 99214 OFFICE O/P EST MOD 30 MIN: CPT | Performed by: FAMILY MEDICINE

## 2020-02-05 NOTE — ASSESSMENT & PLAN NOTE
Still with considerable left-sided neck pain that is radiating into his left arm and fingers  X-rays revealed degenerative changes  Will refer to physical therapy  Continue meloxicam as needed  I would like to see him back in 4-6 weeks    He may need MRI

## 2020-02-05 NOTE — ASSESSMENT & PLAN NOTE
Overall arthralgias in hands and back have improved somewhat since starting doxycycline a few weeks ago  Patient had a positive Lyme titer at that time  I would like to see him back in 4-6 weeks    If symptoms persist, will refer to Rheumatology

## 2020-02-28 ENCOUNTER — TELEPHONE (OUTPATIENT)
Dept: UROLOGY | Facility: MEDICAL CENTER | Age: 60
End: 2020-02-28

## 2020-02-28 NOTE — TELEPHONE ENCOUNTER
Called patient - LMOM to call the office  Patient hasn't had his bloodwork done yet for his appointment on 03/03/20  He needs to have this done prior to his appointment or we will need to reschedule

## 2020-03-02 ENCOUNTER — TELEPHONE (OUTPATIENT)
Dept: UROLOGY | Facility: MEDICAL CENTER | Age: 60
End: 2020-03-02

## 2020-03-02 NOTE — PROGRESS NOTES
LM for pt to call office  Pt has appointment 3/3/20 and need to reschedule his appointment if he did not have his blood work done

## 2020-03-03 DIAGNOSIS — N40.0 BENIGN PROSTATIC HYPERPLASIA WITHOUT LOWER URINARY TRACT SYMPTOMS: Primary | ICD-10-CM

## 2020-03-04 ENCOUNTER — OFFICE VISIT (OUTPATIENT)
Dept: FAMILY MEDICINE CLINIC | Facility: CLINIC | Age: 60
End: 2020-03-04
Payer: COMMERCIAL

## 2020-03-04 VITALS
WEIGHT: 147.25 LBS | OXYGEN SATURATION: 95 % | DIASTOLIC BLOOD PRESSURE: 78 MMHG | RESPIRATION RATE: 17 BRPM | TEMPERATURE: 98.3 F | HEART RATE: 85 BPM | BODY MASS INDEX: 26.42 KG/M2 | SYSTOLIC BLOOD PRESSURE: 154 MMHG

## 2020-03-04 DIAGNOSIS — G89.29 CHRONIC NECK PAIN: ICD-10-CM

## 2020-03-04 DIAGNOSIS — M25.541 ARTHRALGIA OF BOTH HANDS: ICD-10-CM

## 2020-03-04 DIAGNOSIS — A69.20 LYME DISEASE: Primary | ICD-10-CM

## 2020-03-04 DIAGNOSIS — E78.2 MIXED HYPERLIPIDEMIA: ICD-10-CM

## 2020-03-04 DIAGNOSIS — M54.2 CHRONIC NECK PAIN: ICD-10-CM

## 2020-03-04 DIAGNOSIS — I10 ESSENTIAL HYPERTENSION: ICD-10-CM

## 2020-03-04 DIAGNOSIS — M25.542 ARTHRALGIA OF BOTH HANDS: ICD-10-CM

## 2020-03-04 PROCEDURE — 3078F DIAST BP <80 MM HG: CPT | Performed by: FAMILY MEDICINE

## 2020-03-04 PROCEDURE — 3077F SYST BP >= 140 MM HG: CPT | Performed by: FAMILY MEDICINE

## 2020-03-04 PROCEDURE — 99214 OFFICE O/P EST MOD 30 MIN: CPT | Performed by: FAMILY MEDICINE

## 2020-03-04 NOTE — ASSESSMENT & PLAN NOTE
Patient told me that he has had prior history of mildly elevated blood pressures, but never needed to start medication  We had a long discussion regarding lifestyle modification  Patient drinks a large Monster  Beverage every day  I asked him to cut this out  Also recommend sodium reduction  Patient would prefer to remain off blood pressure med for now  But is agreeable to having blood pressure recheck in 1 month    If still suboptimal, will start med

## 2020-03-04 NOTE — ASSESSMENT & PLAN NOTE
Last cholesterol was on December 21st period was 281  Doing well on atorvastatin 80 mg daily  Will continue to monitor   Recommend recheck labs this summer

## 2020-03-04 NOTE — ASSESSMENT & PLAN NOTE
He is still having ongoing neck pain that is radiating into his left arm and fingers  He has gone to physical therapy last year without improvement  Taking meloxicam with some improvement, but could still with considerable pain and radicular symptoms  Will sent for MRI of cervical spine  Will call with results    Possible need for epidural treatments

## 2020-03-04 NOTE — PATIENT INSTRUCTIONS
Obesity   AMBULATORY CARE:   Obesity  is when your body mass index (BMI) is greater than 30  Your healthcare provider will use your height and weight to measure your BMI  The risks of obesity include  many health problems, such as injuries or physical disability  You may need tests to check for the following:  · Diabetes     · High blood pressure or high cholesterol     · Heart disease     · Gallbladder or liver disease     · Cancer of the colon, breast, prostate, liver, or kidney     · Sleep apnea     · Arthritis or gout  Seek care immediately if:   · You have a severe headache, confusion, or difficulty speaking  · You have weakness on one side of your body  · You have chest pain, sweating, or shortness of breath  Contact your healthcare provider if:   · You have symptoms of gallbladder or liver disease, such as pain in your upper abdomen  · You have knee or hip pain and discomfort while walking  · You have symptoms of diabetes, such as intense hunger and thirst, and frequent urination  · You have symptoms of sleep apnea, such as snoring or daytime sleepiness  · You have questions or concerns about your condition or care  Treatment for obesity  focuses on helping you lose weight to improve your health  Even a small decrease in BMI can reduce the risk for many health problems  Your healthcare provider will help you set a weight-loss goal   · Lifestyle changes  are the first step in treating obesity  These include making healthy food choices and getting regular physical activity  Your healthcare provider may suggest a weight-loss program that involves coaching, education, and therapy  · Medicine  may help you lose weight when it is used with a healthy diet and physical activity  · Surgery  can help you lose weight if you are very obese and have other health problems  There are several types of weight-loss surgery  Ask your healthcare provider for more information    Be successful losing weight:   · Set small, realistic goals  An example of a small goal is to walk for 20 minutes 5 days a week  Anther goal is to lose 5% of your body weight  · Tell friends, family members, and coworkers about your goals  and ask for their support  Ask a friend to lose weight with you, or join a weight-loss support group  · Identify foods or triggers that may cause you to overeat , and find ways to avoid them  Remove tempting high-calorie foods from your home and workplace  Place a bowl of fresh fruit on your kitchen counter  If stress causes you to eat, then find other ways to cope with stress  · Keep a diary to track what you eat and drink  Also write down how many minutes of physical activity you do each day  Weigh yourself once a week and record it in your diary  Eating changes: You will need to eat 500 to 1,000 fewer calories each day than you currently eat to lose 1 to 2 pounds a week  The following changes will help you cut calories:  · Eat smaller portions  Use small plates, no larger than 9 inches in diameter  Fill your plate half full of fruits and vegetables  Measure your food using measuring cups until you know what a serving size looks like  · Eat 3 meals and 1 or 2 snacks each day  Plan your meals in advance  Augustina Hodgson and eat at home most of the time  Eat slowly  · Eat fruits and vegetables at every meal   They are low in calories and high in fiber, which makes you feel full  Do not add butter, margarine, or cream sauce to vegetables  Use herbs to season steamed vegetables  · Eat less fat and fewer fried foods  Eat more baked or grilled chicken and fish  These protein sources are lower in calories and fat than red meat  Limit fast food  Dress your salads with olive oil and vinegar instead of bottled dressing  · Limit the amount of sugar you eat  Do not drink sugary beverages  Limit alcohol  Activity changes:  Physical activity is good for your body in many ways   It helps you burn calories and build strong muscles  It decreases stress and depression, and improves your mood  It can also help you sleep better  Talk to your healthcare provider before you begin an exercise program   · Exercise for at least 30 minutes 5 days a week  Start slowly  Set aside time each day for physical activity that you enjoy and that is convenient for you  It is best to do both weight training and an activity that increases your heart rate, such as walking, bicycling, or swimming  · Find ways to be more active  Do yard work and housecleaning  Walk up the stairs instead of using elevators  Spend your leisure time going to events that require walking, such as outdoor festivals or fairs  This extra physical activity can help you lose weight and keep it off  Follow up with your healthcare provider as directed: You may need to meet with a dietitian  Write down your questions so you remember to ask them during your visits  © 2017 [employer] Information is for End User's use only and may not be sold, redistributed or otherwise used for commercial purposes  All illustrations and images included in CareNotes® are the copyrighted property of A D A M [employer] Inc  or Leonid Ness  The above information is an  only  It is not intended as medical advice for individual conditions or treatments  Talk to your doctor, nurse or pharmacist before following any medical regimen to see if it is safe and effective for you

## 2020-03-04 NOTE — ASSESSMENT & PLAN NOTE
Recent Lyme titer was positive  Patient was treated with 3 weeks of doxycycline  Overall his joint pains have improved

## 2020-03-04 NOTE — ASSESSMENT & PLAN NOTE
Patient still with some joint pains in his hands, but overall this is improved since being treated   For Lyme disease  Patient completed 3 weeks of doxycycline  Will continue to monitor    If symptoms persist/worsen, will refer to Rheumatology

## 2020-04-01 ENCOUNTER — TELEMEDICINE (OUTPATIENT)
Dept: FAMILY MEDICINE CLINIC | Facility: CLINIC | Age: 60
End: 2020-04-01
Payer: COMMERCIAL

## 2020-04-01 DIAGNOSIS — A69.20 LYME DISEASE: ICD-10-CM

## 2020-04-01 DIAGNOSIS — M25.542 ARTHRALGIA OF BOTH HANDS: ICD-10-CM

## 2020-04-01 DIAGNOSIS — G89.29 CHRONIC NECK PAIN: ICD-10-CM

## 2020-04-01 DIAGNOSIS — M54.2 CHRONIC NECK PAIN: ICD-10-CM

## 2020-04-01 DIAGNOSIS — M25.541 ARTHRALGIA OF BOTH HANDS: ICD-10-CM

## 2020-04-01 DIAGNOSIS — E78.2 MIXED HYPERLIPIDEMIA: ICD-10-CM

## 2020-04-01 DIAGNOSIS — I10 ESSENTIAL HYPERTENSION: Primary | ICD-10-CM

## 2020-04-01 PROCEDURE — 3078F DIAST BP <80 MM HG: CPT | Performed by: FAMILY MEDICINE

## 2020-04-01 PROCEDURE — 3077F SYST BP >= 140 MM HG: CPT | Performed by: FAMILY MEDICINE

## 2020-04-01 PROCEDURE — 99214 OFFICE O/P EST MOD 30 MIN: CPT | Performed by: FAMILY MEDICINE

## 2020-04-03 ENCOUNTER — TELEPHONE (OUTPATIENT)
Dept: FAMILY MEDICINE CLINIC | Facility: CLINIC | Age: 60
End: 2020-04-03

## 2020-04-07 DIAGNOSIS — E78.5 HYPERLIPIDEMIA, UNSPECIFIED HYPERLIPIDEMIA TYPE: ICD-10-CM

## 2020-04-07 RX ORDER — ATORVASTATIN CALCIUM 80 MG/1
TABLET, FILM COATED ORAL
Qty: 90 TABLET | Refills: 0 | Status: SHIPPED | OUTPATIENT
Start: 2020-04-07 | End: 2020-07-01

## 2020-04-11 ENCOUNTER — NURSE TRIAGE (OUTPATIENT)
Dept: OTHER | Facility: OTHER | Age: 60
End: 2020-04-11

## 2020-04-15 ENCOUNTER — TELEPHONE (OUTPATIENT)
Dept: FAMILY MEDICINE CLINIC | Facility: CLINIC | Age: 60
End: 2020-04-15

## 2020-04-16 ENCOUNTER — TELEMEDICINE (OUTPATIENT)
Dept: FAMILY MEDICINE CLINIC | Facility: CLINIC | Age: 60
End: 2020-04-16
Payer: COMMERCIAL

## 2020-04-16 DIAGNOSIS — I10 ESSENTIAL HYPERTENSION: ICD-10-CM

## 2020-04-16 DIAGNOSIS — M54.2 CHRONIC NECK PAIN: ICD-10-CM

## 2020-04-16 DIAGNOSIS — Z72.0 TOBACCO USE: Primary | ICD-10-CM

## 2020-04-16 DIAGNOSIS — E78.2 MIXED HYPERLIPIDEMIA: ICD-10-CM

## 2020-04-16 DIAGNOSIS — M25.542 ARTHRALGIA OF BOTH HANDS: ICD-10-CM

## 2020-04-16 DIAGNOSIS — I25.10 CORONARY ARTERY DISEASE WITHOUT ANGINA PECTORIS, UNSPECIFIED VESSEL OR LESION TYPE, UNSPECIFIED WHETHER NATIVE OR TRANSPLANTED HEART: ICD-10-CM

## 2020-04-16 DIAGNOSIS — G89.29 CHRONIC NECK PAIN: ICD-10-CM

## 2020-04-16 DIAGNOSIS — M25.541 ARTHRALGIA OF BOTH HANDS: ICD-10-CM

## 2020-04-16 DIAGNOSIS — M54.6 ACUTE LEFT-SIDED THORACIC BACK PAIN: ICD-10-CM

## 2020-04-16 PROCEDURE — 99214 OFFICE O/P EST MOD 30 MIN: CPT | Performed by: FAMILY MEDICINE

## 2020-04-30 ENCOUNTER — CONSULT (OUTPATIENT)
Dept: CARDIOLOGY CLINIC | Facility: CLINIC | Age: 60
End: 2020-04-30
Payer: COMMERCIAL

## 2020-04-30 VITALS
WEIGHT: 151 LBS | SYSTOLIC BLOOD PRESSURE: 120 MMHG | HEIGHT: 63 IN | BODY MASS INDEX: 26.75 KG/M2 | DIASTOLIC BLOOD PRESSURE: 60 MMHG

## 2020-04-30 DIAGNOSIS — Z72.0 TOBACCO USE: ICD-10-CM

## 2020-04-30 DIAGNOSIS — Z71.6 TOBACCO ABUSE COUNSELING: ICD-10-CM

## 2020-04-30 DIAGNOSIS — I10 ESSENTIAL HYPERTENSION: ICD-10-CM

## 2020-04-30 DIAGNOSIS — I25.10 CORONARY ARTERY DISEASE WITHOUT ANGINA PECTORIS, UNSPECIFIED VESSEL OR LESION TYPE, UNSPECIFIED WHETHER NATIVE OR TRANSPLANTED HEART: ICD-10-CM

## 2020-04-30 DIAGNOSIS — E78.2 MIXED HYPERLIPIDEMIA: Primary | ICD-10-CM

## 2020-04-30 DIAGNOSIS — R07.9 CHEST PAIN, UNSPECIFIED TYPE: ICD-10-CM

## 2020-04-30 PROCEDURE — 3074F SYST BP LT 130 MM HG: CPT | Performed by: INTERNAL MEDICINE

## 2020-04-30 PROCEDURE — 3008F BODY MASS INDEX DOCD: CPT | Performed by: INTERNAL MEDICINE

## 2020-04-30 PROCEDURE — 93000 ELECTROCARDIOGRAM COMPLETE: CPT | Performed by: INTERNAL MEDICINE

## 2020-04-30 PROCEDURE — 3078F DIAST BP <80 MM HG: CPT | Performed by: INTERNAL MEDICINE

## 2020-04-30 PROCEDURE — 99244 OFF/OP CNSLTJ NEW/EST MOD 40: CPT | Performed by: INTERNAL MEDICINE

## 2020-04-30 PROCEDURE — 99406 BEHAV CHNG SMOKING 3-10 MIN: CPT | Performed by: INTERNAL MEDICINE

## 2020-04-30 RX ORDER — NITROGLYCERIN 0.4 MG/1
0.4 TABLET SUBLINGUAL
Qty: 30 TABLET | Refills: 1 | Status: SHIPPED | OUTPATIENT
Start: 2020-04-30 | End: 2020-06-17

## 2020-05-08 ENCOUNTER — TELEPHONE (OUTPATIENT)
Dept: CARDIOLOGY CLINIC | Facility: CLINIC | Age: 60
End: 2020-05-08

## 2020-05-11 ENCOUNTER — APPOINTMENT (OUTPATIENT)
Dept: LAB | Facility: CLINIC | Age: 60
End: 2020-05-11
Payer: COMMERCIAL

## 2020-05-11 LAB
ALBUMIN SERPL BCP-MCNC: 3.7 G/DL (ref 3.5–5)
ALP SERPL-CCNC: 97 U/L (ref 46–116)
ALT SERPL W P-5'-P-CCNC: 48 U/L (ref 12–78)
ANION GAP SERPL CALCULATED.3IONS-SCNC: 5 MMOL/L (ref 4–13)
AST SERPL W P-5'-P-CCNC: 19 U/L (ref 5–45)
BASOPHILS # BLD AUTO: 0.09 THOUSANDS/ΜL (ref 0–0.1)
BASOPHILS NFR BLD AUTO: 1 % (ref 0–1)
BILIRUB SERPL-MCNC: 0.35 MG/DL (ref 0.2–1)
BUN SERPL-MCNC: 16 MG/DL (ref 5–25)
CALCIUM SERPL-MCNC: 9.5 MG/DL (ref 8.3–10.1)
CHLORIDE SERPL-SCNC: 107 MMOL/L (ref 100–108)
CO2 SERPL-SCNC: 28 MMOL/L (ref 21–32)
CREAT SERPL-MCNC: 0.94 MG/DL (ref 0.6–1.3)
EOSINOPHIL # BLD AUTO: 0.31 THOUSAND/ΜL (ref 0–0.61)
EOSINOPHIL NFR BLD AUTO: 3 % (ref 0–6)
ERYTHROCYTE [DISTWIDTH] IN BLOOD BY AUTOMATED COUNT: 15.9 % (ref 11.6–15.1)
GFR SERPL CREATININE-BSD FRML MDRD: 87 ML/MIN/1.73SQ M
GLUCOSE P FAST SERPL-MCNC: 101 MG/DL (ref 65–99)
HCT VFR BLD AUTO: 51.3 % (ref 36.5–49.3)
HGB BLD-MCNC: 15.9 G/DL (ref 12–17)
IMM GRANULOCYTES # BLD AUTO: 0.05 THOUSAND/UL (ref 0–0.2)
IMM GRANULOCYTES NFR BLD AUTO: 1 % (ref 0–2)
INR PPP: 0.97 (ref 0.84–1.19)
LYMPHOCYTES # BLD AUTO: 2.63 THOUSANDS/ΜL (ref 0.6–4.47)
LYMPHOCYTES NFR BLD AUTO: 24 % (ref 14–44)
MCH RBC QN AUTO: 27.4 PG (ref 26.8–34.3)
MCHC RBC AUTO-ENTMCNC: 31 G/DL (ref 31.4–37.4)
MCV RBC AUTO: 88 FL (ref 82–98)
MONOCYTES # BLD AUTO: 0.78 THOUSAND/ΜL (ref 0.17–1.22)
MONOCYTES NFR BLD AUTO: 7 % (ref 4–12)
NEUTROPHILS # BLD AUTO: 7.21 THOUSANDS/ΜL (ref 1.85–7.62)
NEUTS SEG NFR BLD AUTO: 64 % (ref 43–75)
NRBC BLD AUTO-RTO: 0 /100 WBCS
PLATELET # BLD AUTO: 236 THOUSANDS/UL (ref 149–390)
PMV BLD AUTO: 11.3 FL (ref 8.9–12.7)
POTASSIUM SERPL-SCNC: 4.8 MMOL/L (ref 3.5–5.3)
PROT SERPL-MCNC: 7.9 G/DL (ref 6.4–8.2)
PROTHROMBIN TIME: 12.5 SECONDS (ref 11.6–14.5)
RBC # BLD AUTO: 5.8 MILLION/UL (ref 3.88–5.62)
SODIUM SERPL-SCNC: 140 MMOL/L (ref 136–145)
WBC # BLD AUTO: 11.07 THOUSAND/UL (ref 4.31–10.16)

## 2020-05-11 PROCEDURE — 80053 COMPREHEN METABOLIC PANEL: CPT | Performed by: INTERNAL MEDICINE

## 2020-05-11 PROCEDURE — 85610 PROTHROMBIN TIME: CPT | Performed by: INTERNAL MEDICINE

## 2020-05-11 PROCEDURE — 85025 COMPLETE CBC W/AUTO DIFF WBC: CPT | Performed by: INTERNAL MEDICINE

## 2020-05-11 PROCEDURE — 36415 COLL VENOUS BLD VENIPUNCTURE: CPT | Performed by: INTERNAL MEDICINE

## 2020-05-13 ENCOUNTER — TELEPHONE (OUTPATIENT)
Dept: INPATIENT UNIT | Facility: HOSPITAL | Age: 60
End: 2020-05-13

## 2020-05-13 RX ORDER — SODIUM CHLORIDE 9 MG/ML
125 INJECTION, SOLUTION INTRAVENOUS CONTINUOUS
Status: CANCELLED | OUTPATIENT
Start: 2020-05-13

## 2020-05-13 RX ORDER — CLOPIDOGREL BISULFATE 75 MG/1
600 TABLET ORAL ONCE
Status: CANCELLED | OUTPATIENT
Start: 2020-05-13 | End: 2020-05-13

## 2020-05-13 RX ORDER — ASPIRIN 81 MG/1
324 TABLET, CHEWABLE ORAL ONCE
Status: CANCELLED | OUTPATIENT
Start: 2020-05-13 | End: 2020-05-13

## 2020-05-14 ENCOUNTER — HOSPITAL ENCOUNTER (OUTPATIENT)
Dept: NON INVASIVE DIAGNOSTICS | Facility: HOSPITAL | Age: 60
Discharge: HOME | End: 2020-05-14
Attending: INTERNAL MEDICINE | Admitting: INTERNAL MEDICINE
Payer: COMMERCIAL

## 2020-05-14 VITALS
SYSTOLIC BLOOD PRESSURE: 117 MMHG | RESPIRATION RATE: 18 BRPM | BODY MASS INDEX: 29.44 KG/M2 | DIASTOLIC BLOOD PRESSURE: 70 MMHG | WEIGHT: 160 LBS | OXYGEN SATURATION: 98 % | HEIGHT: 62 IN | TEMPERATURE: 97.3 F | HEART RATE: 71 BPM

## 2020-05-14 DIAGNOSIS — I25.10 CORONARY ARTERY DISEASE WITHOUT ANGINA PECTORIS, UNSPECIFIED VESSEL OR LESION TYPE, UNSPECIFIED WHETHER NATIVE OR TRANSPLANTED HEART: ICD-10-CM

## 2020-05-14 LAB
ATRIAL RATE: 68 BPM
CHOLEST SERPL-MCNC: 183 MG/DL (ref 50–200)
HDLC SERPL-MCNC: 23 MG/DL
LDLC SERPL CALC-MCNC: 123 MG/DL (ref 0–100)
NONHDLC SERPL-MCNC: 160 MG/DL
P AXIS: 43 DEGREES
PR INTERVAL: 126 MS
QRS AXIS: -13 DEGREES
QRSD INTERVAL: 102 MS
QT INTERVAL: 410 MS
QTC INTERVAL: 435 MS
T WAVE AXIS: 7 DEGREES
TRIGL SERPL-MCNC: 183 MG/DL
VENTRICULAR RATE: 68 BPM

## 2020-05-14 PROCEDURE — C1769 GUIDE WIRE: HCPCS | Performed by: INTERNAL MEDICINE

## 2020-05-14 PROCEDURE — 93454 CORONARY ARTERY ANGIO S&I: CPT | Performed by: INTERNAL MEDICINE

## 2020-05-14 PROCEDURE — 99152 MOD SED SAME PHYS/QHP 5/>YRS: CPT | Performed by: INTERNAL MEDICINE

## 2020-05-14 PROCEDURE — 80061 LIPID PANEL: CPT | Performed by: INTERNAL MEDICINE

## 2020-05-14 PROCEDURE — 93010 ELECTROCARDIOGRAM REPORT: CPT | Performed by: INTERNAL MEDICINE

## 2020-05-14 PROCEDURE — 93005 ELECTROCARDIOGRAM TRACING: CPT

## 2020-05-14 PROCEDURE — C1894 INTRO/SHEATH, NON-LASER: HCPCS | Performed by: INTERNAL MEDICINE

## 2020-05-14 RX ORDER — CLOPIDOGREL BISULFATE 75 MG/1
600 TABLET ORAL ONCE
Status: COMPLETED | OUTPATIENT
Start: 2020-05-14 | End: 2020-05-14

## 2020-05-14 RX ORDER — HEPARIN SODIUM 1000 [USP'U]/ML
INJECTION, SOLUTION INTRAVENOUS; SUBCUTANEOUS CODE/TRAUMA/SEDATION MEDICATION
Status: COMPLETED | OUTPATIENT
Start: 2020-05-14 | End: 2020-05-14

## 2020-05-14 RX ORDER — NITROGLYCERIN 20 MG/100ML
INJECTION INTRAVENOUS CODE/TRAUMA/SEDATION MEDICATION
Status: COMPLETED | OUTPATIENT
Start: 2020-05-14 | End: 2020-05-14

## 2020-05-14 RX ORDER — VERAPAMIL HCL 2.5 MG/ML
AMPUL (ML) INTRAVENOUS CODE/TRAUMA/SEDATION MEDICATION
Status: COMPLETED | OUTPATIENT
Start: 2020-05-14 | End: 2020-05-14

## 2020-05-14 RX ORDER — SODIUM CHLORIDE 9 MG/ML
125 INJECTION, SOLUTION INTRAVENOUS CONTINUOUS
Status: DISCONTINUED | OUTPATIENT
Start: 2020-05-14 | End: 2020-05-14 | Stop reason: HOSPADM

## 2020-05-14 RX ORDER — ASPIRIN 81 MG/1
324 TABLET, CHEWABLE ORAL ONCE
Status: COMPLETED | OUTPATIENT
Start: 2020-05-14 | End: 2020-05-14

## 2020-05-14 RX ORDER — MIDAZOLAM HYDROCHLORIDE 2 MG/2ML
INJECTION, SOLUTION INTRAMUSCULAR; INTRAVENOUS CODE/TRAUMA/SEDATION MEDICATION
Status: COMPLETED | OUTPATIENT
Start: 2020-05-14 | End: 2020-05-14

## 2020-05-14 RX ORDER — FENTANYL CITRATE 50 UG/ML
INJECTION, SOLUTION INTRAMUSCULAR; INTRAVENOUS CODE/TRAUMA/SEDATION MEDICATION
Status: COMPLETED | OUTPATIENT
Start: 2020-05-14 | End: 2020-05-14

## 2020-05-14 RX ORDER — SODIUM CHLORIDE 9 MG/ML
125 INJECTION, SOLUTION INTRAVENOUS CONTINUOUS
Status: DISPENSED | OUTPATIENT
Start: 2020-05-14 | End: 2020-05-14

## 2020-05-14 RX ORDER — LIDOCAINE HYDROCHLORIDE 10 MG/ML
INJECTION, SOLUTION EPIDURAL; INFILTRATION; INTRACAUDAL; PERINEURAL CODE/TRAUMA/SEDATION MEDICATION
Status: COMPLETED | OUTPATIENT
Start: 2020-05-14 | End: 2020-05-14

## 2020-05-14 RX ADMIN — VERAPAMIL HYDROCHLORIDE 1.25 MG: 2.5 INJECTION INTRAVENOUS at 09:51

## 2020-05-14 RX ADMIN — CLOPIDOGREL BISULFATE 600 MG: 75 TABLET ORAL at 08:15

## 2020-05-14 RX ADMIN — Medication 200 MCG: at 09:51

## 2020-05-14 RX ADMIN — LIDOCAINE HYDROCHLORIDE 0.1 ML: 10 INJECTION, SOLUTION EPIDURAL; INFILTRATION; INTRACAUDAL; PERINEURAL at 09:48

## 2020-05-14 RX ADMIN — IOHEXOL 30 ML: 350 INJECTION, SOLUTION INTRAVENOUS at 09:56

## 2020-05-14 RX ADMIN — HEPARIN SODIUM 4000 UNITS: 1000 INJECTION INTRAVENOUS; SUBCUTANEOUS at 09:51

## 2020-05-14 RX ADMIN — SODIUM CHLORIDE 125 ML/HR: 0.9 INJECTION, SOLUTION INTRAVENOUS at 08:17

## 2020-05-14 RX ADMIN — FENTANYL CITRATE 50 MCG: 50 INJECTION, SOLUTION INTRAMUSCULAR; INTRAVENOUS at 09:47

## 2020-05-14 RX ADMIN — ASPIRIN 81 MG 243 MG: 81 TABLET ORAL at 08:14

## 2020-05-14 RX ADMIN — MIDAZOLAM 2 MG: 1 INJECTION INTRAMUSCULAR; INTRAVENOUS at 09:47

## 2020-06-03 ENCOUNTER — TELEPHONE (OUTPATIENT)
Dept: CARDIOLOGY CLINIC | Facility: CLINIC | Age: 60
End: 2020-06-03

## 2020-06-17 ENCOUNTER — OFFICE VISIT (OUTPATIENT)
Dept: CARDIOLOGY CLINIC | Facility: CLINIC | Age: 60
End: 2020-06-17
Payer: COMMERCIAL

## 2020-06-17 VITALS
WEIGHT: 154 LBS | TEMPERATURE: 99 F | SYSTOLIC BLOOD PRESSURE: 154 MMHG | HEIGHT: 62 IN | HEART RATE: 68 BPM | BODY MASS INDEX: 28.34 KG/M2 | DIASTOLIC BLOOD PRESSURE: 84 MMHG

## 2020-06-17 DIAGNOSIS — E78.2 MIXED HYPERLIPIDEMIA: ICD-10-CM

## 2020-06-17 DIAGNOSIS — I25.10 CORONARY ARTERY DISEASE WITHOUT ANGINA PECTORIS, UNSPECIFIED VESSEL OR LESION TYPE, UNSPECIFIED WHETHER NATIVE OR TRANSPLANTED HEART: Primary | ICD-10-CM

## 2020-06-17 DIAGNOSIS — I10 ESSENTIAL HYPERTENSION: ICD-10-CM

## 2020-06-17 DIAGNOSIS — Z72.0 TOBACCO USE: ICD-10-CM

## 2020-06-17 PROCEDURE — 99214 OFFICE O/P EST MOD 30 MIN: CPT | Performed by: INTERNAL MEDICINE

## 2020-06-17 PROCEDURE — 3079F DIAST BP 80-89 MM HG: CPT | Performed by: INTERNAL MEDICINE

## 2020-06-17 PROCEDURE — 93000 ELECTROCARDIOGRAM COMPLETE: CPT | Performed by: INTERNAL MEDICINE

## 2020-06-17 PROCEDURE — 3077F SYST BP >= 140 MM HG: CPT | Performed by: INTERNAL MEDICINE

## 2020-06-17 PROCEDURE — 3008F BODY MASS INDEX DOCD: CPT | Performed by: INTERNAL MEDICINE

## 2020-06-17 RX ORDER — AMLODIPINE BESYLATE 5 MG/1
5 TABLET ORAL DAILY
Qty: 90 TABLET | Refills: 2 | Status: SHIPPED | OUTPATIENT
Start: 2020-06-17 | End: 2021-01-19 | Stop reason: SDUPTHER

## 2020-06-30 DIAGNOSIS — E78.5 HYPERLIPIDEMIA, UNSPECIFIED HYPERLIPIDEMIA TYPE: ICD-10-CM

## 2020-07-01 ENCOUNTER — TELEPHONE (OUTPATIENT)
Dept: FAMILY MEDICINE CLINIC | Facility: CLINIC | Age: 60
End: 2020-07-01

## 2020-07-01 RX ORDER — ATORVASTATIN CALCIUM 80 MG/1
TABLET, FILM COATED ORAL
Qty: 90 TABLET | Refills: 0 | Status: SHIPPED | OUTPATIENT
Start: 2020-07-01 | End: 2020-09-26 | Stop reason: SDUPTHER

## 2020-07-01 NOTE — TELEPHONE ENCOUNTER
PT DOES NOT HAVE INSURANCE AS OF NOW  HIS NIECE IS TAKING CARE OF PAPER WORK FOR HIM AND HE WILL CALL BACK WHEN HE KNOWS HE HAS INSURANCE

## 2020-09-26 DIAGNOSIS — E78.5 HYPERLIPIDEMIA, UNSPECIFIED HYPERLIPIDEMIA TYPE: ICD-10-CM

## 2020-09-28 RX ORDER — ATORVASTATIN CALCIUM 80 MG/1
80 TABLET, FILM COATED ORAL DAILY
Qty: 90 TABLET | Refills: 0 | Status: SHIPPED | OUTPATIENT
Start: 2020-09-28 | End: 2021-01-17

## 2020-12-16 ENCOUNTER — TELEPHONE (OUTPATIENT)
Dept: CARDIOLOGY CLINIC | Facility: CLINIC | Age: 60
End: 2020-12-16

## 2021-01-01 NOTE — TELEPHONE ENCOUNTER
LM for pt to call office  Pt has appointment 3/3/20 and need to reschedule his appointment if he did not have his blood work done 
ineffective breastfeeding/sore nipples/knowledge deficit
knowledge deficit
ineffective breastfeeding/sore nipples/knowledge deficit

## 2021-01-17 DIAGNOSIS — E78.5 HYPERLIPIDEMIA, UNSPECIFIED HYPERLIPIDEMIA TYPE: ICD-10-CM

## 2021-01-17 RX ORDER — ATORVASTATIN CALCIUM 80 MG/1
TABLET, FILM COATED ORAL
Qty: 90 TABLET | Refills: 0 | Status: SHIPPED | OUTPATIENT
Start: 2021-01-17 | End: 2021-07-08

## 2021-01-19 ENCOUNTER — OFFICE VISIT (OUTPATIENT)
Dept: CARDIOLOGY CLINIC | Facility: CLINIC | Age: 61
End: 2021-01-19
Payer: COMMERCIAL

## 2021-01-19 VITALS
RESPIRATION RATE: 16 BRPM | BODY MASS INDEX: 30.36 KG/M2 | TEMPERATURE: 98.6 F | WEIGHT: 165 LBS | HEART RATE: 84 BPM | HEIGHT: 62 IN | DIASTOLIC BLOOD PRESSURE: 88 MMHG | SYSTOLIC BLOOD PRESSURE: 138 MMHG

## 2021-01-19 DIAGNOSIS — Z72.0 TOBACCO USE: ICD-10-CM

## 2021-01-19 DIAGNOSIS — I25.10 CORONARY ARTERY DISEASE WITHOUT ANGINA PECTORIS, UNSPECIFIED VESSEL OR LESION TYPE, UNSPECIFIED WHETHER NATIVE OR TRANSPLANTED HEART: Primary | ICD-10-CM

## 2021-01-19 DIAGNOSIS — I10 ESSENTIAL HYPERTENSION: ICD-10-CM

## 2021-01-19 DIAGNOSIS — E78.2 MIXED HYPERLIPIDEMIA: ICD-10-CM

## 2021-01-19 PROCEDURE — 99214 OFFICE O/P EST MOD 30 MIN: CPT | Performed by: INTERNAL MEDICINE

## 2021-01-19 RX ORDER — AMLODIPINE BESYLATE 10 MG/1
10 TABLET ORAL DAILY
Qty: 90 TABLET | Refills: 2
Start: 2021-01-19 | End: 2021-05-26 | Stop reason: ALTCHOICE

## 2021-01-19 RX ORDER — LOSARTAN POTASSIUM 25 MG/1
25 TABLET ORAL DAILY
Qty: 90 TABLET | Refills: 1
Start: 2021-01-19 | End: 2021-05-26

## 2021-01-19 NOTE — PROGRESS NOTES
Cardiology Office Note  MD Ilir Kang MD Garald Hock, DO, MD Katty Nelson DO, Samuel Finn DO, [facility]  ----------------------------------------------------------------  [address] [place] Pierre 56 y o  male MRN: [id number]  Unit/Bed#:  Encounter: [id number]      History of Present Illness: It was a please to see Bry Bansal in the office today for follow-up CV evaluation  He is here today following an episode of chest discomfort that he had at [facility] with elevated troponin  Subsequently, he was discharged to home and evaluated in the office  His symptoms were concerning for angina with a midsternal chest tightness going into his shoulder  He previously had a stress test in the past that was found to be negative for myocardial ischemia  Based on his recurring symptoms concerning for angina, he was sent for cardiac catheterization  Cardiac catheterization demonstrated nonobstructive coronary artery disease  Following the catheterization on May 14, 2020, he was discharged to home  Since our encounter in June 2020, he has had elevated blood pressure discomfort and presented to the emergency department at [place] in late December 2020  He was found to have significantly elevated pressure in was subsequently increased on amlodipine and started on losartan 25 mg daily  Since that time, he has been doing better  He denies any further chest pain, pressure, tightness or squeezing  Denies lower extremity swelling, orthopnea or paroxysmal nocturnal dyspnea  Denies lightheadedness or dizziness  Review of Systems:  Review of Systems   Constitution: Negative for decreased appetite, fever, weight gain and weight loss  HENT: Negative for congestion and sore throat  Eyes: Negative for visual disturbance     Cardiovascular: Negative for chest pain, dyspnea on exertion, leg swelling, near-syncope and palpitations  Respiratory: Negative for cough and shortness of breath  Hematologic/Lymphatic: Negative for bleeding problem  Skin: Negative for rash  Musculoskeletal: Negative for back pain, myalgias and neck pain  Gastrointestinal: Negative for abdominal pain and nausea  Neurological: Negative for light-headedness and weakness  Psychiatric/Behavioral: Negative for depression  No past medical history on file  Past Surgical History:   Procedure Laterality Date    LEG SURGERY Left        Social History     Family History   Problem Relation Age of Onset    Thyroid cancer Brother        No Known Allergies      Current Outpatient Medications:     aspirin 81 MG tablet, Take 1 tablet by mouth daily, Disp: , Rfl:     atorvastatin (LIPITOR) 80 mg tablet, TAKE 1 TABLET BY MOUTH EVERY DAY, Disp: 90 tablet, Rfl: 0    vitamin B-12 (CYANOCOBALAMIN) 2000 MCG TABS, Take 1 tablet by mouth daily, Disp: , Rfl:     amLODIPine (NORVASC) 10 mg tablet, Take 1 tablet (10 mg total) by mouth daily, Disp: 90 tablet, Rfl: 2    losartan (COZAAR) 25 mg tablet, Take 1 tablet (25 mg total) by mouth daily, Disp: 90 tablet, Rfl: 1    Vitals:    01/19/21 1549   BP: 138/88   Pulse: 84   Resp: 16   Temp: 98 6 °F (37 °C)   Weight: 74 8 kg (165 lb)   Height: 5' 2" (1 575 m)       PHYSICAL EXAMINATION:  Gen: Awake, Alert, NAD  HEENT: AT/NC, Anicteric, mmm  Neck: Supple, No elevated JVP  Resp: Coarse BS bilaterally  CV: RRR +S1, S2, No m/r/g  Abd: Soft, NT/ND + BS  Ext: warm, no LE edema bilaterally  Neuro:  Follows commands, moves all extermities  Psych: Appropriate affect    --------------------------------------------------------------------------------    CATH:      Results for orders placed during the hospital encounter of 05/14/20   Cardiac catheterization    Betsy Thomson [address] [place] [id number]  [phone]    [facility]    Invasive Cardiovascular Lab Complete Report    Patient: Genny Decker  MR number: [id number]  Account number: [de-identified]  Study date: 2020  Gender: Male  : 1960  Height: 61 8 in  Weight: 150 7 lb  BSA: 1 69 mï¾²    Allergies: NO KNOWN ALLERGIES    Diagnostic Cardiologist:  Ainsley Fuentes MD  Primary Physician:  Gabriela Wallace DO    SUMMARY    CORONARY CIRCULATION:  Left main: Normal   LAD: There was minor plaque of the distal vessel  The major D1 branch was normal  There was minor plaque of the small D2 branch  Circumflex: Normal  The major OM branch was also normal   RCA: The vessel was normal sized and dominant, giving rise to the PDA and two posterolateral branches  There was mild non-obstructive plaque  REPORT ELEMENT SELECTION:  Right radial access was employed  There were no complications  Summary:  Non-obstructive plaque  Plan: follow-up with cardiology  INDICATIONS:  --  Possible CAD: atypical chest pain  PROCEDURES PERFORMED    --  Left coronary angiography  --  Right coronary angiography  --  Outpatient  --  Mod Sedation Same Physician Initial 15min  --  Coronary Catheterization (w/o LHC)  PROCEDURE: The risks and alternatives of the procedures and conscious sedation were explained to the patient and informed consent was obtained  The patient was brought to the cath lab and placed on the table  The planned puncture sites  were prepped and draped in the usual sterile fashion  --  Right radial artery access  After performing an Loyd's test to verify adequate ulnar artery supply to the hand, the radial site was prepped  The puncture site was infiltrated with local anesthetic  The vessel was accessed using the  modified Seldinger technique, a wire was advanced into the vessel, and a sheath was advanced over the wire into the vessel  --  Left coronary artery angiography   A catheter was advanced over a guidewire into the aorta and positioned in the left coronary artery ostium under fluoroscopic guidance  Angiography was performed  --  Right coronary artery angiography  A catheter was advanced over a guidewire into the aorta and positioned in the right coronary artery ostium under fluoroscopic guidance  Angiography was performed  --  Outpatient  --  Mod Sedation Same Physician Initial 15min  --  Coronary Catheterization (w/o [facility])  PROCEDURE COMPLETION: The patient tolerated the procedure well and was discharged from the cath lab  TIMING: Test started at 09:45  Test concluded at 09:57  HEMOSTASIS: The sheath was removed  The site was compressed with a Hemoband  device  Hemostasis was obtained  MEDICATIONS GIVEN: Verapamil (Isoptin, Calan, Covera), 1 25 mg, IA, at 09:51  Versed (2mg/2ml), 2 mg, IV, at 09:45  Fentanyl (1OOmcg/2 ml), 50 mcg, IV, at 09:45  1% Lidocaine, 0 1 ml, subcutaneously, at  09:48  Heparin 1000 units/ml, 4,000 units, IV, at 09:50  Nitroglycerin (200mcg/ml), 200 mcg, at 09:51  CONTRAST GIVEN: 30 ml Omnipaque (350mg I /ml)  RADIATION EXPOSURE: Fluoroscopy time: 1 48 min  CORONARY VESSELS:   --  Left main: Normal   --  LAD: There was minor plaque of the distal vessel  The major D1 branch was normal  There was minor plaque of the small D2 branch  --  Circumflex: Normal  The major OM branch was also normal   --  RCA: The vessel was normal sized and dominant, giving rise to the PDA and two posterolateral branches  There was mild non-obstructive plaque  NOTE: Right radial access was employed  There were no complications      Prepared and signed by    Ainsley Fuentes MD  Signed 05/14/2020 10:55:01    CC: Dr Maddie Meneses    Study diagram    Hemodynamic tables    Pressures:  Baseline  Pressures:  - HR: 77  Pressures:  - Rhythm:  Pressures:  -- Aortic Pressure (S/D/M): 118/69/68    Outputs:  Baseline  Outputs:  -- CALCULATIONS: [age]  Outputs:  -- CALCULATIONS: Body Surface Area: 1 69  Outputs:  -- CALCULATIONS: Height in cm: 157 00  Outputs:  -- CALCULATIONS: Sex: Male  Outputs:  -- CALCULATIONS: Weight in k 50         --------------------------------------------------------------------------------    ECGs:  No results found for this visit on 21  Lab Results   Component Value Date    WBC 11 07 (H) 2020    HGB 15 9 2020    HCT 51 3 (H) 2020    MCV 88 2020     2020      Lab Results   Component Value Date    SODIUM 140 2020    K 4 8 2020     2020    CO2 28 2020    BUN 16 2020    CREATININE 0 94 2020    CALCIUM 9 5 2020      Lab Results   Component Value Date    HGBA1C 6 1 (H) 2020      Lab Results   Component Value Date    CHOL 230 (H) 2017    CHOL 264 (H) 10/26/2016    CHOL 282 (H) 2016     Lab Results   Component Value Date    HDL 23 (L) 2020    HDL 35 (L) 2019    HDL 41 2017     Lab Results   Component Value Date    LDLCALC 123 (H) 2020    LDLCALC 222 (H) 2019     Lab Results   Component Value Date    TRIG 183 (H) 2020    TRIG 121 2019    TRIG 124 2017         1  Coronary artery disease without angina pectoris, unspecified vessel or lesion type, unspecified whether native or transplanted heart  -     amLODIPine (NORVASC) 10 mg tablet; Take 1 tablet (10 mg total) by mouth daily    2  Essential hypertension  -     amLODIPine (NORVASC) 10 mg tablet; Take 1 tablet (10 mg total) by mouth daily  -     losartan (COZAAR) 25 mg tablet; Take 1 tablet (25 mg total) by mouth daily    3  Mixed hyperlipidemia    4   Tobacco use        IMPRESSION:  · Precordial chest 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 hypertension  · Nuclear pharmacologic stress test negative for myocardial ischemia, 2020  · LVEF 55%, AV sclerosis, MAC, trace MR, 2020  · CAD s/p cath w/minimal plaque of the LAD, D2 and RCA, May 2020  · Hypertension  · Dyslipidemia w/ LDL 200 mg/dL and non- mg/dL, April 2020  · History of Lyme disease s/p completion of doxycycline  · Overweight  · Prediabetes  · Tobacco abuse  · Family history of CAD with mother    PLAN:  It was a pleasure to see Saqib Storm in the office today for follow-up evaluation  Since our last encounter, he was up titrated on his blood pressure medications now on amlodipine 10 mg daily and losartan 25 mg daily  Today his blood pressure and heart rate are currently stable  He admits to significant dietary indiscretion with putting salt on his food and eating food such as soups and pork products  He has no symptoms concerning for angina and no signs or symptoms of heart failure  He examines to be euvolemic in the office today  Repeat cholesterol has been elevated with an LDL in the 140s  Overall, aside from his 1 hypertensive episode, he has been feeling well  Based on his clinical presentation, the following recommendations:    1  Recommend aggressive salt restriction to less than 2400 mg per day  I believe that much of his elevated blood pressure is related to dietary indiscretion  2  Continue amlodipine 10 mg daily and losartan 25 mg daily  I have encouraged the patient to take his blood pressure at home and should his systolic pressure sustained greater than 417 or diastolic pressure sustained greater than 90, I have recommended that he call the office so that we may further increase his losartan medication  3  Continue high-intensity statin at Lipitor 80 mg q h s  Tomas Khan Repeat lipid panel shows LDL is not to goal   We have talked about the possibility of starting PCSK9 inhibitors  The patient would like to think about this further  I have also discussed the possibility of sending him for lipidology evaluation  Patient also like to think about whether he would like to be seen by lipid experts  In the meanwhile, I have highly encouraged him to pursue the Vegan diet    4  As always, I have recommended a heart healthy diet and exercise regimen  5  Tobacco cessation has been advised  6  We will follow up with him in 6 months  As always, please do not hesitate to call with any questions  Portions of the record may have been created with voice recognition software  Occasional wrong word or "sound a like" substitutions may have occurred due to the inherent limitations of voice recognition software  Read the chart carefully and recognize, using context, where substitutions have occurred          Signed: Erwin Ireland DO, [facility] [place]

## 2021-04-27 ENCOUNTER — IMMUNIZATIONS (OUTPATIENT)
Dept: FAMILY MEDICINE CLINIC | Facility: HOSPITAL | Age: 61
End: 2021-04-27

## 2021-04-27 DIAGNOSIS — Z23 ENCOUNTER FOR IMMUNIZATION: Primary | ICD-10-CM

## 2021-04-27 PROCEDURE — 91301 SARS-COV-2 / COVID-19 MRNA VACCINE (MODERNA) 100 MCG: CPT

## 2021-04-27 PROCEDURE — 0011A SARS-COV-2 / COVID-19 MRNA VACCINE (MODERNA) 100 MCG: CPT

## 2021-05-25 RX ORDER — AMLODIPINE BESYLATE 5 MG/1
TABLET ORAL
COMMUNITY
Start: 2021-04-16 | End: 2021-05-26 | Stop reason: ALTCHOICE

## 2021-05-26 ENCOUNTER — OFFICE VISIT (OUTPATIENT)
Dept: FAMILY MEDICINE CLINIC | Facility: CLINIC | Age: 61
End: 2021-05-26
Payer: COMMERCIAL

## 2021-05-26 ENCOUNTER — APPOINTMENT (OUTPATIENT)
Dept: RADIOLOGY | Facility: CLINIC | Age: 61
End: 2021-05-26
Payer: COMMERCIAL

## 2021-05-26 VITALS
OXYGEN SATURATION: 97 % | DIASTOLIC BLOOD PRESSURE: 82 MMHG | SYSTOLIC BLOOD PRESSURE: 138 MMHG | BODY MASS INDEX: 30.73 KG/M2 | TEMPERATURE: 97.8 F | WEIGHT: 167 LBS | HEIGHT: 62 IN | HEART RATE: 86 BPM

## 2021-05-26 DIAGNOSIS — R22.31 MASS OF RIGHT HAND: Primary | ICD-10-CM

## 2021-05-26 DIAGNOSIS — R22.31 MASS OF RIGHT HAND: ICD-10-CM

## 2021-05-26 PROCEDURE — 73130 X-RAY EXAM OF HAND: CPT

## 2021-05-26 PROCEDURE — 99214 OFFICE O/P EST MOD 30 MIN: CPT | Performed by: FAMILY MEDICINE

## 2021-05-26 NOTE — ASSESSMENT & PLAN NOTE
1 cm round mass that is palpable and visible during right pointer finger flexion  Suspect possible cyst or thickening on flexor tendon  Obtain Xray of hand  Refer patient to Ortho hand specialist for further evaluation and recommendations

## 2021-05-26 NOTE — PROGRESS NOTES
Assessment/Plan:    1  Mass of right hand  Assessment & Plan:  1 cm round mass that is palpable and visible during right pointer finger flexion  Suspect possible cyst or thickening on flexor tendon  Obtain Xray of hand  Refer patient to Ortho hand specialist for further evaluation and recommendations  Orders:  -     Ambulatory referral to Orthopedic Surgery; Future  -     XR hand 3+ vw right; Future; Expected date: 05/26/2021      Subjective:      Patient ID: Sonia Richmond is a 56 y o  male  HPI    Patient works in [employer] and over a year ago he hit his right pointer finger on a pole  The finger flexed forward in pain  Since that injury he has had a mass in his posterior hand  The mass is not visible at rest   However, when he flexes his pointer finger there is a visible mass protruding on his posterior hand  The mass does not protrude when he flexes his other fingers  He states that it painful to touch  He denies weakness, numbness or tingling of his hand and wrist   Patient is able use his hand to perform every day activities with no restrictions  The following portions of the patient's history were reviewed and updated as appropriate: allergies, current medications, past family history, past medical history, past social history, past surgical history, and problem list       Current Outpatient Medications:     aspirin 81 MG tablet, Take 1 tablet by mouth daily, Disp: , Rfl:     atorvastatin (LIPITOR) 80 mg tablet, TAKE 1 TABLET BY MOUTH EVERY DAY, Disp: 90 tablet, Rfl: 0    vitamin B-12 (CYANOCOBALAMIN) 2000 MCG TABS, Take 1 tablet by mouth daily, Disp: , Rfl:       Review of Systems   Constitutional: Negative for chills and fever  HENT: Negative for ear pain and sore throat  Eyes: Negative for pain and visual disturbance  Respiratory: Negative for cough and shortness of breath  Cardiovascular: Negative for chest pain and palpitations     Gastrointestinal: Negative for abdominal pain and vomiting  Genitourinary: Negative for dysuria and hematuria  Musculoskeletal: Negative for arthralgias and back pain  +right hand mass   Skin: Negative for color change and rash  Neurological: Negative for seizures and syncope  All other systems reviewed and are negative  Objective:      /82 (BP Location: Left arm, Patient Position: Sitting, Cuff Size: Large)   Pulse 86   Temp 97 8 °F (36 6 °C) (Tympanic)   Ht 5' 2" (1 575 m)   Wt 75 8 kg (167 lb)   SpO2 97%   BMI 30 54 kg/m²          Physical Exam  Vitals signs and nursing note reviewed  Constitutional:       General: He is not in acute distress  Appearance: Normal appearance  He is not ill-appearing  HENT:      Head: Normocephalic and atraumatic  Eyes:      Extraocular Movements: Extraocular movements intact  Conjunctiva/sclera: Conjunctivae normal    Cardiovascular:      Rate and Rhythm: Normal rate and regular rhythm  Heart sounds: Normal heart sounds  No murmur  Pulmonary:      Effort: Pulmonary effort is normal  No respiratory distress  Breath sounds: Normal breath sounds  No wheezing  Musculoskeletal:      Right hand: He exhibits tenderness and deformity  He exhibits normal range of motion, no laceration and no swelling  Normal sensation noted  Normal strength noted  Left hand: Normal         Hands:    Skin:     General: Skin is warm  Neurological:      General: No focal deficit present  Mental Status: He is alert and oriented to person, place, and time  Psychiatric:         Mood and Affect: Mood normal          Behavior: Behavior normal          Thought Content:  Thought content normal

## 2021-05-27 ENCOUNTER — IMMUNIZATIONS (OUTPATIENT)
Dept: FAMILY MEDICINE CLINIC | Facility: HOSPITAL | Age: 61
End: 2021-05-27

## 2021-05-27 DIAGNOSIS — Z23 ENCOUNTER FOR IMMUNIZATION: Primary | ICD-10-CM

## 2021-05-27 PROCEDURE — 91301 SARS-COV-2 / COVID-19 MRNA VACCINE (MODERNA) 100 MCG: CPT

## 2021-05-27 PROCEDURE — 0012A SARS-COV-2 / COVID-19 MRNA VACCINE (MODERNA) 100 MCG: CPT

## 2021-06-03 ENCOUNTER — TELEPHONE (OUTPATIENT)
Dept: FAMILY MEDICINE CLINIC | Facility: CLINIC | Age: 61
End: 2021-06-03

## 2021-06-03 ENCOUNTER — TELEPHONE (OUTPATIENT)
Dept: VASCULAR SURGERY | Facility: CLINIC | Age: 61
End: 2021-06-03

## 2021-06-03 NOTE — TELEPHONE ENCOUNTER
Call the patient to Schedule his 6 mo appointment   Left the message to call us back at [phone]

## 2021-06-03 NOTE — TELEPHONE ENCOUNTER
Pt's niece called concerned about all BP meds being stopped  Dr Vannesa Reinoso is the person that prescribed  Please advise    [phone]

## 2021-06-07 NOTE — TELEPHONE ENCOUNTER
His blood pressure medications were not discontinued by me  At our office visit, patient reported that he was NOT taking any blood pressure medications so they were removed from his med list   If his cardiologist wants him to take these medications, he should be following those recommendations  He should follow up with Dr Kori Hsu and his PCP to discuss his blood pressure medications and bring updated and accurate med list to his next office visit    Thank you

## 2021-06-07 NOTE — TELEPHONE ENCOUNTER
Radha, please see my message to Heriberto Reyes regarding patient's blood pressure medications  He reported he was not taking them   Thanks

## 2021-07-08 DIAGNOSIS — E78.5 HYPERLIPIDEMIA, UNSPECIFIED HYPERLIPIDEMIA TYPE: ICD-10-CM

## 2021-07-08 RX ORDER — ATORVASTATIN CALCIUM 80 MG/1
TABLET, FILM COATED ORAL
Qty: 90 TABLET | Refills: 0 | Status: SHIPPED | OUTPATIENT
Start: 2021-07-08 | End: 2022-01-19

## 2021-07-14 ENCOUNTER — OFFICE VISIT (OUTPATIENT)
Dept: OBGYN CLINIC | Facility: MEDICAL CENTER | Age: 61
End: 2021-07-14
Payer: COMMERCIAL

## 2021-07-14 VITALS
BODY MASS INDEX: 30.36 KG/M2 | HEIGHT: 62 IN | HEART RATE: 98 BPM | SYSTOLIC BLOOD PRESSURE: 148 MMHG | WEIGHT: 165 LBS | DIASTOLIC BLOOD PRESSURE: 90 MMHG | RESPIRATION RATE: 18 BRPM

## 2021-07-14 DIAGNOSIS — M65.331 TRIGGER FINGER, RIGHT MIDDLE FINGER: Primary | ICD-10-CM

## 2021-07-14 DIAGNOSIS — M67.40: ICD-10-CM

## 2021-07-14 PROCEDURE — 20550 NJX 1 TENDON SHEATH/LIGAMENT: CPT | Performed by: ORTHOPAEDIC SURGERY

## 2021-07-14 PROCEDURE — 99244 OFF/OP CNSLTJ NEW/EST MOD 40: CPT | Performed by: ORTHOPAEDIC SURGERY

## 2021-07-14 RX ORDER — LIDOCAINE HYDROCHLORIDE 10 MG/ML
0.5 INJECTION, SOLUTION INFILTRATION; PERINEURAL
Status: COMPLETED | OUTPATIENT
Start: 2021-07-14 | End: 2021-07-14

## 2021-07-14 RX ORDER — BETAMETHASONE SODIUM PHOSPHATE AND BETAMETHASONE ACETATE 3; 3 MG/ML; MG/ML
3 INJECTION, SUSPENSION INTRA-ARTICULAR; INTRALESIONAL; INTRAMUSCULAR; SOFT TISSUE
Status: COMPLETED | OUTPATIENT
Start: 2021-07-14 | End: 2021-07-14

## 2021-07-14 RX ADMIN — LIDOCAINE HYDROCHLORIDE 0.5 ML: 10 INJECTION, SOLUTION INFILTRATION; PERINEURAL at 14:38

## 2021-07-14 RX ADMIN — BETAMETHASONE SODIUM PHOSPHATE AND BETAMETHASONE ACETATE 3 MG: 3; 3 INJECTION, SUSPENSION INTRA-ARTICULAR; INTRALESIONAL; INTRAMUSCULAR; SOFT TISSUE at 14:38

## 2021-07-14 NOTE — PROGRESS NOTES
Chief Complaint      right middle finger pain, right wrist mass    History of Present Illness     Jaylyn Rashid is a 59 y o   right hand dominant male who presents to the office today for evaluation of his right wrist mass and right middle finger pain  I am seeing him in consultation at the request of Antony Paz DO  Patient states he has had a right dorsal wrist mass for about a year now  He notes no incident of injury at that time  He reports the mass is nontender to him  He does experience movement of the mass with flexion and extension of his index finger  He also states in the last year he has been having right middle finger pain  Reports it is hard to fully flex the finger that he has pain in the area of the A1 pulley  He does note clicking and locking at time of the finger  He reports no previous injuries to the right middle finger  Patient reports no numbness or tingling  He has had no treatment for these conditions  He is currently working as a   Past Medical History:   Diagnosis Date    Hyperlipidemia     Hypertension        Past Surgical History:   Procedure Laterality Date    LEG SURGERY Left        No Known Allergies    Current Outpatient Medications on File Prior to Visit   Medication Sig Dispense Refill    aspirin 81 MG tablet Take 1 tablet by mouth daily      atorvastatin (LIPITOR) 80 mg tablet TAKE 1 TABLET BY MOUTH EVERY DAY 90 tablet 0    vitamin B-12 (CYANOCOBALAMIN) 2000 MCG TABS Take 1 tablet by mouth daily       No current facility-administered medications on file prior to visit  Social History     Tobacco Use    Smoking status: Current Every Day Smoker    Smokeless tobacco: Former User   Vaping Use    Vaping Use: Never used   Substance Use Topics    Alcohol use: No    Drug use: No       Family History   Problem Relation Age of Onset    Thyroid cancer Brother        Review of Systems     As stated in the HPI   All other systems were reviewed and are negative  Physical Exam     /90   Pulse 98   Resp 18   Ht 5' 2" (1 575 m)   Wt 74 8 kg (165 lb)   BMI 30 18 kg/m²     GENERAL: This is a well-developed, well-nourished, age-appropriate patient in no acute distress  The patient is alert and oriented x3  Pleasant and cooperative  Eyes: Anicteric sclerae  Extraocular movements appear intact  HENT: Nares are patent with no drainage  Lungs: There is equal chest rise on inspection  Breathing is non-labored with no audible wheezing  Cardiovascular: No cyanosis  No upper extremity lymphadema  Skin: Skin is warm to touch  No obvious skin lesions or rashes other than described below  Neurologic: No ataxia  Psychiatric: Mood and affect are appropriate  Right wrist  Skin intact  No erythema or ecchymosis noted   Mass noted in line with 2nd metacarpal base, mass moves with extensor tendon of index finger  Mass is nontender to palpate  Palpable dorsal osteophyte of 3rd MCP joint  Full wrist motion in flexion extension noted  DPC 2-3 middle finger,  DPC 0 to remaining digits   Tender at the A1 pulley for middle finger,  No active triggering noted  No significant tenderness to the dorsal middle MCP  5/5 Motor to the APB, FDI, FDP2, FDP5, EDC  Sensation intact to light touch in the median, radial, and ulnar nerve distribution    Brisk capillary refill noted to all digits    Data Review     Labs:   A1c from 12/24/2020 was 6 1%    Electrodiagnostic Testing:  None today     Imaging:   x-rays of the right hand obtained on 05/26/2021 were personally reviewed by me in the office and demonstrate large osteophyte at the 3rd MCP joint with decreased joint space    Assessment and Plan      Diagnoses and all orders for this visit:    Trigger finger, right middle finger  -     Hand/upper extremity injection: R long A1    Ganglion of tendon          64-year-old male with right middle trigger finger and right tendon sheath ganglion as well as what appears to be asymptomatic middle finger MCP arthritis  Patient and I discussed in regards to the ganglion cyst that this is a benign cyst   I do not believe this is contributing to his painful symptoms at the middle finger  I would recommend continuing to monitor the cyst   I did discuss with him that aspiration of the cyst may be performed in the future as well as surgical excision if indicated  He may continue to use the wrist as tolerated for activities  In regards to his right middle finger pain, patient and I discussed that this is likely coming from a tendinitis  We did review his x-rays today which demonstrate significant arthritic changes of the 3rd MCP joint but this does not seem to be what is bothersome to him today  We discussed non operative treatment for a trigger finger of corticosteroid injection  Risks and benefits of injection were reviewed  Risks of the injection including pain, infection, tendon rupture, progression of arthritis, fat atrophy, depigmentation, and worsening of symptoms were all discussed with the patient  There was good understanding by the patient and they wish to proceed  Injection was tolerated well to the right middle trigger finger  He may continue activities as tolerated  He may return to his job tomorrow  I will see him back on an as-needed basis  I discussed the natural course and treatment options of trigger finger with the patient  We discussed that the etiology is thickening of the tendon sheath surrounding the flexor tendons in the distal palm and that common symptoms are pain, catching, clicking, popping, and locking of the digit  We also discussed that there are surgical and nonsurgical means to treat this  Activity modification can be somewhat helpful, though corticosteroid injections are often the first-line treatment for this condition    The published efficacy of the 1st injection for trigger finger is about 45% at achieving full remission and that we may pursue up to 3 injections per the patient's desire if symptomatology returns  At any point, the patient may choose to have surgical intervention which would involve release of the A1 pulley  We discussed the technical aspects, risks, and benefits of the procedure, perioperative care, and expected recovery from this surgery  I discussed the etiology and natural course of a ganglion with the patient  We discussed that these originate from joints or tendons and often have a stalk that comes out from the structure that axis of valve to move fluid from either the joint or the tendon into a balloon like structure  The fluid can move into the ganglion and allowed to grow, or recede  This is often based on activity and inflammation  Treatment of this condition was discussed as well  Nonsurgical management involves watchful waiting as many of these will resolve with rest, bracing, and anti-inflammatory medications, or an aspiration and injection with corticosteroid  Aspiration and injection, specifically of dorsal wrist ganglions, typically leads to alleviation of the ganglion in about 50% of patients  Surgical excision was also discussed and this comes with an 80% success rate for volar wrist ganglions and 92% success rate with dorsal wrist ganglions in terms of prevention of recurrence  Follow Up:   P r n  To Do Next Visit:     PROCEDURES PERFORMED:  Hand/upper extremity injection: R long A1  Universal Protocol:  Consent: Verbal consent obtained    Consent given by: patient    Supporting Documentation  Indications: pain   Procedure Details  Condition:trigger finger Location: long finger - R long A1   Preparation: Patient was prepped and draped in the usual sterile fashion  Needle size: 25 G  Ultrasound guidance: no  Medications administered: 0 5 mL lidocaine 1 %; 3 mg betamethasone acetate-betamethasone sodium phosphate 6 (3-3) mg/mL    Patient tolerance: patient tolerated the procedure well with no immediate complications  Dressing:  Sterile dressing applied              Scribe Attestation    I,:  Praneeth Mcdowell MA am acting as a scribe while in the presence of the attending physician :       I,:  Grace Doe MD personally performed the services described in this documentation    as scribed in my presence :

## 2021-07-22 ENCOUNTER — OFFICE VISIT (OUTPATIENT)
Dept: CARDIOLOGY CLINIC | Facility: CLINIC | Age: 61
End: 2021-07-22
Payer: COMMERCIAL

## 2021-07-22 VITALS
SYSTOLIC BLOOD PRESSURE: 136 MMHG | BODY MASS INDEX: 29.7 KG/M2 | HEIGHT: 62 IN | DIASTOLIC BLOOD PRESSURE: 68 MMHG | HEART RATE: 80 BPM | WEIGHT: 161.4 LBS

## 2021-07-22 DIAGNOSIS — E78.2 MIXED HYPERLIPIDEMIA: ICD-10-CM

## 2021-07-22 DIAGNOSIS — Z72.0 TOBACCO USE: ICD-10-CM

## 2021-07-22 DIAGNOSIS — I25.10 CORONARY ARTERY DISEASE WITHOUT ANGINA PECTORIS, UNSPECIFIED VESSEL OR LESION TYPE, UNSPECIFIED WHETHER NATIVE OR TRANSPLANTED HEART: Primary | ICD-10-CM

## 2021-07-22 DIAGNOSIS — I10 ESSENTIAL HYPERTENSION: ICD-10-CM

## 2021-07-22 PROCEDURE — 99214 OFFICE O/P EST MOD 30 MIN: CPT | Performed by: INTERNAL MEDICINE

## 2021-07-22 RX ORDER — AMLODIPINE BESYLATE 10 MG/1
10 TABLET ORAL DAILY
Qty: 90 TABLET | Refills: 1 | Status: SHIPPED | OUTPATIENT
Start: 2021-07-22 | End: 2022-01-19

## 2021-07-22 RX ORDER — AMLODIPINE BESYLATE 5 MG/1
5 TABLET ORAL DAILY
COMMUNITY
Start: 2021-06-03 | End: 2021-07-22 | Stop reason: SDUPTHER

## 2021-07-22 NOTE — PROGRESS NOTES
Cardiology Office Note  MD Reynaldo Daniel MD Dorn Devon, DO, MD Ginger Barfield DO, Aisha Robertson DO, [facility] - [place]  ----------------------------------------------------------------  [address] [place] Sar[place] Pierre 59 y o  male MRN: [id number]  Unit/Bed#:  Encounter: [id number]      History of Present Illness: It was a please to see Pascale Milton in the office today for follow-up CV evaluation  He is here today following an episode of chest discomfort that he had at [facility] with elevated troponin  Subsequently, he was discharged to home and evaluated in the office  His symptoms were concerning for angina with a midsternal chest tightness going into his shoulder  He previously had a stress test in the past that was found to be negative for myocardial ischemia  Based on his recurring symptoms concerning for angina, he was sent for cardiac catheterization  Cardiac catheterization demonstrated nonobstructive coronary artery disease  Following the catheterization on May 14, 2020, he was discharged to home  Since our encounter in June 2020, he has had elevated blood pressure discomfort and presented to the emergency department at [place] in late December 2020  He was found to have significantly elevated pressure in was subsequently increased on amlodipine and started on losartan 25 mg daily  Following our last encounter, he discontinued his blood pressure medications and was re-initiated back on amlodipine 5 mg daily  He reports that he took his medication today  It is unclear why he discontinue the medication as and is uncertain as to why he stopped up  He denies any chest pain, pressure, tightness or squeezing  Denies lightheadedness, dizziness or palpitations  Denies lower extremity swelling, orthopnea or paroxysmal nocturnal dyspnea    Admits to being in his usual state of health  Admits to an issue with the tendon in his right hand, but is seeing orthopedic surgery regarding this finding  Review of Systems:  Review of Systems   Constitutional: Negative for decreased appetite, fever, weight gain and weight loss  HENT: Negative for congestion and sore throat  Eyes: Negative for visual disturbance  Cardiovascular: Negative for chest pain, dyspnea on exertion, leg swelling, near-syncope and palpitations  Respiratory: Negative for cough and shortness of breath  Hematologic/Lymphatic: Negative for bleeding problem  Skin: Negative for rash  Musculoskeletal: Negative for back pain, myalgias and neck pain  Gastrointestinal: Negative for abdominal pain and nausea  Neurological: Negative for light-headedness and weakness  Psychiatric/Behavioral: Negative for depression  Past Medical History:   Diagnosis Date    Hyperlipidemia     Hypertension        Past Surgical History:   Procedure Laterality Date    LEG SURGERY Left        Social History     Family History   Problem Relation Age of Onset    Thyroid cancer Brother        No Known Allergies      Current Outpatient Medications:     amLODIPine (NORVASC) 5 mg tablet, Take 5 mg by mouth daily, Disp: , Rfl:     aspirin 81 MG tablet, Take 1 tablet by mouth daily, Disp: , Rfl:     atorvastatin (LIPITOR) 80 mg tablet, TAKE 1 TABLET BY MOUTH EVERY DAY, Disp: 90 tablet, Rfl: 0    vitamin B-12 (CYANOCOBALAMIN) 2000 MCG TABS, Take 1 tablet by mouth daily, Disp: , Rfl:     Vitals:    07/22/21 1013   Weight: 73 2 kg (161 lb 6 4 oz)   Height: 5' 2" (1 575 m)       PHYSICAL EXAMINATION:  Gen: Awake, Alert, NAD  Head/eyes: AT/NC, pupils equal and round, Anicteric  ENT: mmm  Neck: Supple, No elevated JVP, trachea midline  Resp: CTA bilaterally no w/r/r  CV: RRR +S1, S2, No m/r/g  Abd: Soft, NT/ND + BS  Ext: no LE edema bilaterally  Neuro:  Follows commands, moves all extermities  Psych: Appropriate affect, normal mood, pleasant attitude, non-combative  Skin: warm; no rash, erythema or venous stasis changes on exposed skin    --------------------------------------------------------------------------------    CATH:      Results for orders placed during the hospital encounter of 20   Cardiac catheterization    Narrative [address]  [address]  [phone]    [facility]    Invasive Cardiovascular Lab Complete Report    Patient: Eze Marquez  MR number: [id number]  Account number: [de-identified]  Study date: 2020  Gender: Male  : 1960  Height: 61 8 in  Weight: 150 7 lb  BSA: 1 69 mï¾²    Allergies: NO KNOWN ALLERGIES    Diagnostic Cardiologist:  Jennie Murray MD  Primary Physician:  Dexter Leija DO    SUMMARY    CORONARY CIRCULATION:  Left main: Normal   LAD: There was minor plaque of the distal vessel  The major D1 branch was normal  There was minor plaque of the small D2 branch  Circumflex: Normal  The major OM branch was also normal   RCA: The vessel was normal sized and dominant, giving rise to the PDA and two posterolateral branches  There was mild non-obstructive plaque  REPORT ELEMENT SELECTION:  Right radial access was employed  There were no complications  Summary:  Non-obstructive plaque  Plan: follow-up with cardiology  INDICATIONS:  --  Possible CAD: atypical chest pain  PROCEDURES PERFORMED    --  Left coronary angiography  --  Right coronary angiography  --  Outpatient  --  Mod Sedation Same Physician Initial 15min  --  Coronary Catheterization (w/o [facility])  PROCEDURE: The risks and alternatives of the procedures and conscious sedation were explained to the patient and informed consent was obtained  The patient was brought to the cath lab and placed on the table  The planned puncture sites  were prepped and draped in the usual sterile fashion  --  Right radial artery access   After performing an Loyd's test to verify adequate ulnar artery supply to the hand, the radial site was prepped  The puncture site was infiltrated with local anesthetic  The vessel was accessed using the  modified Seldinger technique, a wire was advanced into the vessel, and a sheath was advanced over the wire into the vessel  --  Left coronary artery angiography  A catheter was advanced over a guidewire into the aorta and positioned in the left coronary artery ostium under fluoroscopic guidance  Angiography was performed  --  Right coronary artery angiography  A catheter was advanced over a guidewire into the aorta and positioned in the right coronary artery ostium under fluoroscopic guidance  Angiography was performed  --  Outpatient  --  Mod Sedation Same Physician Initial 15min  --  Coronary Catheterization (w/o [facility])  PROCEDURE COMPLETION: The patient tolerated the procedure well and was discharged from the cath lab  TIMING: Test started at 09:45  Test concluded at 09:57  HEMOSTASIS: The sheath was removed  The site was compressed with a Hemoband  device  Hemostasis was obtained  MEDICATIONS GIVEN: Verapamil (Isoptin, Calan, Covera), 1 25 mg, IA, at 09:51  Versed (2mg/2ml), 2 mg, IV, at 09:45  Fentanyl (1OOmcg/2 ml), 50 mcg, IV, at 09:45  1% Lidocaine, 0 1 ml, subcutaneously, at  09:48  Heparin 1000 units/ml, 4,000 units, IV, at 09:50  Nitroglycerin (200mcg/ml), 200 mcg, at 09:51  CONTRAST GIVEN: 30 ml Omnipaque (350mg I /ml)  RADIATION EXPOSURE: Fluoroscopy time: 1 48 min  CORONARY VESSELS:   --  Left main: Normal   --  LAD: There was minor plaque of the distal vessel  The major D1 branch was normal  There was minor plaque of the small D2 branch  --  Circumflex: Normal  The major OM branch was also normal   --  RCA: The vessel was normal sized and dominant, giving rise to the PDA and two posterolateral branches  There was mild non-obstructive plaque  NOTE: Right radial access was employed    There were no complications  Prepared and signed by    Zari Barbosa MD  Signed 2020 10:55:01    CC: Dr Vasquez Setting    Study diagram    Hemodynamic tables    Pressures:  Baseline  Pressures:  - HR: 77  Pressures:  - Rhythm:  Pressures:  -- Aortic Pressure (S/D/M): 118/69/68    Outputs:  Baseline  Outputs:  -- CALCULATIONS: [age]  Outputs:  -- CALCULATIONS: Body Surface Area: 1 69  Outputs:  -- CALCULATIONS: Height in cm: 157 00  Outputs:  -- CALCULATIONS: Sex: Male  Outputs:  -- CALCULATIONS: Weight in k 50         --------------------------------------------------------------------------------    ECGs:  No results found for this visit on 21  Lab Results   Component Value Date    WBC 11 07 (H) 2020    HGB 15 9 2020    HCT 51 3 (H) 2020    MCV 88 2020     2020      Lab Results   Component Value Date    SODIUM 140 2020    K 4 8 2020     2020    CO2 28 2020    BUN 16 2020    CREATININE 0 94 2020    CALCIUM 9 5 2020      Lab Results   Component Value Date    HGBA1C 6 1 (H) 2020      Lab Results   Component Value Date    CHOL 230 (H) 2017    CHOL 264 (H) 10/26/2016    CHOL 282 (H) 2016     Lab Results   Component Value Date    HDL 23 (L) 2020    HDL 35 (L) 2019    HDL 41 2017     Lab Results   Component Value Date    LDLCALC 123 (H) 2020    LDLCALC 222 (H) 2019     Lab Results   Component Value Date    TRIG 183 (H) 2020    TRIG 121 2019    TRIG 124 2017         1  Coronary artery disease without angina pectoris, unspecified vessel or lesion type, unspecified whether native or transplanted heart    2  Essential hypertension    3  Mixed hyperlipidemia    4   Tobacco use        IMPRESSION:  · Precordial chest 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 hypertension  · Pharmacologic nuclear stress test negative for myocardial ischemia, 2020  · LVEF 55%, AV sclerosis, MAC, trace MR, April 2020  · CAD s/p cath w/minimal plaque of the LAD, D2 and RCA, May 2020  · Hypertension  · Dyslipidemia w/  mg/dL and non- mg/dL, December 2020  · History of Lyme disease s/p completion of doxycycline  · Overweight  · Prediabetes  · Tobacco abuse  · Family history of CAD with mother    PLAN:  It was a pleasure to see Mima Sharp in the office today for follow-up evaluation  Patient is now down to amlodipine 5 mg daily and is off losartan  He has no symptoms concerning for angina and no signs or symptoms of heart failure  He examines to be euvolemic in the office today  Blood pressure was elevated in the office today and he reports elevated blood pressure readings at home  We have reviewed his prior lipid panel from December 2020  He is able to greater than METs on a daily basis without any exertional symptoms  He has been taking his aspirin and Lipitor as prescribed  Based on his clinical presentation, I have the following recommendations:    1  His blood pressure being mildly elevated in the office today and on home readings has been with 500 mm Hg systolic multiple times  For this reason, I recommend increasing his amlodipine to 10 mg daily  Risks and benefits discussed  Patient will double up on his current dose of medication and I will call in the amlodipine to the pharmacy  He would like to check his blood pressures at home and report back to us  2  Recommend aggressive salt restriction to less than 2400 mg per day  We have discussed this again length  3  Continue find statin Lipitor 80 mg daily  Goal LDL is less than 70 mg/dL with history of CAD  Should his LDL not be to goal on repeat readings, I recommend that we start Zetia 10 mg daily  4  We have discussed the Mediterranean diet and the Vegan diet again  He would like to hold off on PCSK9 inhibitor and is not a fan of injectable medications  5   As always, I recommend a heart healthy diet low in sodium and exercise regimen  6  We will follow up with him in 6 months  As always, please do not hesitate to call with any questions  Portions of the record may have been created with voice recognition software  Occasional wrong word or "sound a like" substitutions may have occurred due to the inherent limitations of voice recognition software  Read the chart carefully and recognize, using context, where substitutions have occurred        Signed: Prasanth Mcnamara DO, [facility]

## 2021-08-12 ENCOUNTER — OFFICE VISIT (OUTPATIENT)
Dept: OBGYN CLINIC | Facility: MEDICAL CENTER | Age: 61
End: 2021-08-12
Payer: COMMERCIAL

## 2021-08-12 VITALS
BODY MASS INDEX: 29.63 KG/M2 | SYSTOLIC BLOOD PRESSURE: 127 MMHG | HEIGHT: 62 IN | HEART RATE: 81 BPM | DIASTOLIC BLOOD PRESSURE: 77 MMHG | WEIGHT: 161 LBS

## 2021-08-12 DIAGNOSIS — M67.40: ICD-10-CM

## 2021-08-12 DIAGNOSIS — M19.049 ARTHRITIS OF FINGER: ICD-10-CM

## 2021-08-12 DIAGNOSIS — M65.331 TRIGGER FINGER, RIGHT MIDDLE FINGER: Primary | ICD-10-CM

## 2021-08-12 PROCEDURE — 20600 DRAIN/INJ JOINT/BURSA W/O US: CPT | Performed by: ORTHOPAEDIC SURGERY

## 2021-08-12 PROCEDURE — 20612 ASPIRATE/INJ GANGLION CYST: CPT | Performed by: ORTHOPAEDIC SURGERY

## 2021-08-12 PROCEDURE — 99214 OFFICE O/P EST MOD 30 MIN: CPT | Performed by: ORTHOPAEDIC SURGERY

## 2021-08-12 RX ADMIN — BETAMETHASONE SODIUM PHOSPHATE AND BETAMETHASONE ACETATE 3 MG: 3; 3 INJECTION, SUSPENSION INTRA-ARTICULAR; INTRALESIONAL; INTRAMUSCULAR; SOFT TISSUE at 12:28

## 2021-08-12 RX ADMIN — LIDOCAINE HYDROCHLORIDE 0.5 ML: 10 INJECTION, SOLUTION INFILTRATION; PERINEURAL at 12:28

## 2021-08-12 NOTE — PROGRESS NOTES
Chief Complaint     Right middle finger pain and wrist mass      History of Present Illness     Justine Tubbs is a 59 y o  right hand dominant male who presents to the office today for follow up evaluation of right wrist mass and right middle finger pain  Patient received an injection for a trigger finger at his last visit  He states this only helped a slight amount  He continues to have pain both along the volar and dorsal aspect of his MCP joint of this finger  He also continues to have a mass along his dorsal wrist that can create discomfort when he tries to extend his wrist          Past Medical History:   Diagnosis Date    Hyperlipidemia     Hypertension        Past Surgical History:   Procedure Laterality Date    LEG SURGERY Left        No Known Allergies    Current Outpatient Medications on File Prior to Visit   Medication Sig Dispense Refill    amLODIPine (NORVASC) 10 mg tablet Take 1 tablet (10 mg total) by mouth daily 90 tablet 1    aspirin 81 MG tablet Take 1 tablet by mouth daily      atorvastatin (LIPITOR) 80 mg tablet TAKE 1 TABLET BY MOUTH EVERY DAY 90 tablet 0    vitamin B-12 (CYANOCOBALAMIN) 2000 MCG TABS Take 1 tablet by mouth daily       No current facility-administered medications on file prior to visit  Social History     Tobacco Use    Smoking status: Current Every Day Smoker    Smokeless tobacco: Former User   Vaping Use    Vaping Use: Never used   Substance Use Topics    Alcohol use: No    Drug use: No       Family History   Problem Relation Age of Onset    Thyroid cancer Brother        Review of Systems     As stated in the HPI  All other systems were reviewed and are negative  Physical Exam     /77   Pulse 81   Ht 5' 2" (1 575 m)   Wt 73 kg (161 lb)   BMI 29 45 kg/m²     GENERAL: This is a well-developed, well-nourished, age-appropriate patient in no acute distress  The patient is alert and oriented x3  Pleasant and cooperative    Eyes: Anicteric sclerae  Extraocular movements appear intact  HENT: Nares are patent with no drainage  Lungs: There is equal chest rise on inspection  Breathing is non-labored with no audible wheezing  Cardiovascular: No cyanosis  No upper extremity lymphadema  Skin: Skin is warm to touch  No obvious skin lesions or rashes other than described below  Neurologic: No ataxia  Psychiatric: Mood and affect are appropriate  Right Upper Extremity:  Patient with tenderness to palpation A1 pulley middle finger  Patient with arthritic deformity to the middle finger MCP joint  He describes tenderness to palpation of this joint as well > A1 pulley  Patient with ganglion cyst located along dorsal radial wrist   This is noted to be just above the extensor tendon and moves with the extensor tendon of the index finger  Cyst is nontender to palpation  Full wrist flexion, wrist extension limited secondary to pain  [address] 1   5/5 Motor to the APB, FDI, FDP2, FDP5, EDC  Sensation intact to light touch in the median, radial, and ulnar nerve distribution  Brisk capillary refill  Data Review     Labs:  None    Electrodiagnostic Testing:  None    Imaging:  None    Assessment and Plan      Diagnoses and all orders for this visit:    Trigger finger, right middle finger    Ganglion of tendon    Arthritis of finger       59year old male with right middle trigger finger, middle MCP joint arthritis and right tendon sheath ganglion off the index extensor tendon  I explained to the patient that with where he is having his pain and the fact that the previous injection into the A1 pulley did not provide much relief, I do wonder if the majority of his pain is actually coming from his MCP joint  Therefore, I proposed we inject this today for both diagnostic and therapeutic purposes  Patient agrees  In addition, I advised patient that we could pursue aspiration of the ganglion cyst in the office today if he is interested   I did explain that there is a 50% chance this could be curative, but also 50% it could come back  Patient agrees to proceed with this  Patient tolerated both procedures well  He then was placed into a compressive ace wrap with padding and instructed to wear this the rest of the day  I did discuss with the patient today that I will be leaving the practice, so unfortunately I cannot follow up with him to see if this treatment has worked, but that he most definitely can follow up with one of my partners if he's still having symptoms in 4-6 weeks         Follow Up: 4-6 weeks if still symptomatic    PROCEDURES PERFORMED:  Small joint arthrocentesis: L long MCP  Universal Protocol:  Risks and benefits: risks, benefits and alternatives were discussed  Consent given by: patient  Patient identity confirmed: verbally with patient    Supporting Documentation  Indications: pain   Procedure Details  Location: long finger - L long MCP  Needle size: 25 G  Approach: dorsal  Medications administered: 0 5 mL lidocaine 1 %; 3 mg betamethasone acetate-betamethasone sodium phosphate 6 (3-3) mg/mL    Patient tolerance: patient tolerated the procedure well with no immediate complications  Dressing:  Sterile dressing applied    Hand/upper extremity injection: R wrist  Universal Protocol:  Risks and benefits: risks, benefits and alternatives were discussed  Consent given by: patient  Patient understanding: patient states understanding of the procedure being performed  Patient identity confirmed: verbally with patient    Supporting Documentation  Indications: therapeutic   Procedure Details  Condition:dorsal carpal ganglion Condition comment: Extensor tendon ganglion cyst   Site: R wrist (wrist extensor tendon)   Needle size: 18 G  Approach: dorsal  Medications administered: 2 mL lidocaine 1 %  Aspirate: clear (gelatin-like)    Patient tolerance: patient tolerated the procedure well with no immediate complications  Dressing:  Sterile dressing applied

## 2021-08-12 NOTE — LETTER
August 12, 2021     Patient: Emil Holder   YOB: 1960   Date of Visit: 8/12/2021       To Whom it May Concern:    Emil Holder is under my professional care  He was seen in my office on 8/12/2021  Please excuse patient from work today  He may return to work tomorrow  If you have any questions or concerns, please don't hesitate to call           Sincerely,          Brigid Doshi MD        CC: No Recipients

## 2021-08-18 RX ORDER — LIDOCAINE HYDROCHLORIDE 10 MG/ML
0.5 INJECTION, SOLUTION INFILTRATION; PERINEURAL
Status: COMPLETED | OUTPATIENT
Start: 2021-08-12 | End: 2021-08-12

## 2021-08-18 RX ORDER — BETAMETHASONE SODIUM PHOSPHATE AND BETAMETHASONE ACETATE 3; 3 MG/ML; MG/ML
3 INJECTION, SUSPENSION INTRA-ARTICULAR; INTRALESIONAL; INTRAMUSCULAR; SOFT TISSUE
Status: COMPLETED | OUTPATIENT
Start: 2021-08-12 | End: 2021-08-12

## 2021-08-18 RX ORDER — LIDOCAINE HYDROCHLORIDE 10 MG/ML
2 INJECTION, SOLUTION INFILTRATION; PERINEURAL
Status: COMPLETED | OUTPATIENT
Start: 2021-08-18 | End: 2021-08-18

## 2021-08-18 RX ADMIN — LIDOCAINE HYDROCHLORIDE 2 ML: 10 INJECTION, SOLUTION INFILTRATION; PERINEURAL at 17:51

## 2021-09-01 DIAGNOSIS — I25.10 CORONARY ARTERY DISEASE WITHOUT ANGINA PECTORIS, UNSPECIFIED VESSEL OR LESION TYPE, UNSPECIFIED WHETHER NATIVE OR TRANSPLANTED HEART: ICD-10-CM

## 2021-09-01 DIAGNOSIS — I10 ESSENTIAL HYPERTENSION: ICD-10-CM

## 2021-09-01 RX ORDER — AMLODIPINE BESYLATE 10 MG/1
10 TABLET ORAL DAILY
Qty: 90 TABLET | Refills: 1 | Status: SHIPPED | OUTPATIENT
Start: 2021-09-01 | End: 2022-01-19

## 2022-01-17 NOTE — PROGRESS NOTES
Cardiology Office Note  MD Zena Jonhson MD Georgi Melia, DO, MD Dave Soto DO, Leonardo Landry DO, [facility] [place]  ----------------------------------------------------------------  [address] [place] Pierre 59 y o  male MRN: [id number]  Unit/Bed#:  Encounter: [id number]      History of Present Illness: It was a please to see Francia España in the office today for follow-up CV evaluation  He is here today following an episode of chest discomfort that he had at [facility] with elevated troponin  Subsequently, he was discharged to home and evaluated in the office  His symptoms were concerning for angina with a midsternal chest tightness going into his shoulder  He previously had a stress test in the past that was found to be negative for myocardial ischemia  Based on his recurring symptoms concerning for angina, he was sent for cardiac catheterization  Cardiac catheterization demonstrated nonobstructive coronary artery disease  Following the catheterization on May 14, 2020, he was discharged to home  Since our encounter in June 2020, he has had elevated blood pressure discomfort and presented to the emergency department at [place] in late December 2020  He was found to have significantly elevated pressure in was subsequently increased on amlodipine and started on losartan 25 mg daily  His blood pressure had previously been elevated and he was increased to amlodipine 10 mg daily  He has been taking his medications without any reported adverse effects  He has also been taking high-intensity statin  Has not had recent blood work repeated  Denies chest pain, pressure, tightness or squeezing  Denies lightheadedness, dizziness or palpitations  Denies lower extremity swelling, orthopnea or paroxysmal nocturnal dyspnea    He admits to being fairly active and climbing stairs without any exertional symptoms  Review of Systems:  Review of Systems   Constitutional: Negative for decreased appetite, fever, weight gain and weight loss  HENT: Negative for congestion and sore throat  Eyes: Negative for visual disturbance  Cardiovascular: Negative for chest pain, dyspnea on exertion, leg swelling, near-syncope and palpitations  Respiratory: Negative for cough and shortness of breath  Hematologic/Lymphatic: Negative for bleeding problem  Skin: Negative for rash  Musculoskeletal: Negative for back pain, myalgias and neck pain  Gastrointestinal: Negative for abdominal pain and nausea  Neurological: Negative for light-headedness and weakness  Psychiatric/Behavioral: Negative for depression         Past Medical History:   Diagnosis Date    Hyperlipidemia     Hypertension        Past Surgical History:   Procedure Laterality Date    LEG SURGERY Left        Social History     Family History   Problem Relation Age of Onset    Thyroid cancer Brother        No Known Allergies      Current Outpatient Medications:     amLODIPine (NORVASC) 10 mg tablet, Take 1 tablet (10 mg total) by mouth daily, Disp: 90 tablet, Rfl: 1    aspirin 81 MG tablet, Take 1 tablet by mouth daily, Disp: , Rfl:     atorvastatin (LIPITOR) 80 mg tablet, TAKE 1 TABLET BY MOUTH EVERY DAY, Disp: 90 tablet, Rfl: 0    vitamin B-12 (CYANOCOBALAMIN) 2000 MCG TABS, Take 1 tablet by mouth daily, Disp: , Rfl:     amLODIPine (NORVASC) 10 mg tablet, Take 1 tablet (10 mg total) by mouth daily, Disp: 90 tablet, Rfl: 1    Vitals:    01/19/22 0945   BP: 130/78   BP Location: Left arm   Patient Position: Sitting   Cuff Size: Large   Pulse: 102   Weight: 75 8 kg (167 lb 3 2 oz)       PHYSICAL EXAMINATION:  Gen: Awake, Alert, NAD  Head/eyes: AT/NC, pupils equal and round, Anicteric  ENT: mmm  Neck: Supple, No elevated JVP, trachea midline  Resp: CTA bilaterally no w/r/r  CV: RRR +S1, S2, No m/r/g  Abd: Soft, NT/ND + BS  Ext: no LE edema bilaterally  Neuro: Follows commands, moves all extermities  Psych: Appropriate affect, happy mood, pleasant attitude, non-combative  Skin: warm; no rash, erythema or venous stasis changes on exposed skin    --------------------------------------------------------------------------------    CATH:      Results for orders placed during the hospital encounter of 20   Cardiac catheterization    Narrative [address]  [address]  [phone]    [facility]    Invasive Cardiovascular Lab Complete Report    Patient: Shawn Gonzalez  MR number: [id number]  Account number: [de-identified]  Study date: 2020  Gender: Male  : 1960  Height: 61 8 in  Weight: 150 7 lb  BSA: 1 69 mï¾²    Allergies: NO KNOWN ALLERGIES    Diagnostic Cardiologist:  Miranda Rosenberg MD  Primary Physician:  Maryanne Daily DO    SUMMARY    CORONARY CIRCULATION:  Left main: Normal   LAD: There was minor plaque of the distal vessel  The major D1 branch was normal  There was minor plaque of the small D2 branch  Circumflex: Normal  The major OM branch was also normal   RCA: The vessel was normal sized and dominant, giving rise to the PDA and two posterolateral branches  There was mild non-obstructive plaque  REPORT ELEMENT SELECTION:  Right radial access was employed  There were no complications  Summary:  Non-obstructive plaque  Plan: follow-up with cardiology  INDICATIONS:  --  Possible CAD: atypical chest pain  PROCEDURES PERFORMED    --  Left coronary angiography  --  Right coronary angiography  --  Outpatient  --  Mod Sedation Same Physician Initial 15min  --  Coronary Catheterization (w/o LHC)  PROCEDURE: The risks and alternatives of the procedures and conscious sedation were explained to the patient and informed consent was obtained  The patient was brought to the cath lab and placed on the table   The planned puncture sites  were prepped and draped in the usual sterile fashion  --  Right radial artery access  After performing an Loyd's test to verify adequate ulnar artery supply to the hand, the radial site was prepped  The puncture site was infiltrated with local anesthetic  The vessel was accessed using the  modified Seldinger technique, a wire was advanced into the vessel, and a sheath was advanced over the wire into the vessel  --  Left coronary artery angiography  A catheter was advanced over a guidewire into the aorta and positioned in the left coronary artery ostium under fluoroscopic guidance  Angiography was performed  --  Right coronary artery angiography  A catheter was advanced over a guidewire into the aorta and positioned in the right coronary artery ostium under fluoroscopic guidance  Angiography was performed  --  Outpatient  --  Mod Sedation Same Physician Initial 15min  --  Coronary Catheterization (w/o [facility])  PROCEDURE COMPLETION: The patient tolerated the procedure well and was discharged from the cath lab  TIMING: Test started at 09:45  Test concluded at 09:57  HEMOSTASIS: The sheath was removed  The site was compressed with a Hemoband  device  Hemostasis was obtained  MEDICATIONS GIVEN: Verapamil (Isoptin, Calan, Covera), 1 25 mg, IA, at 09:51  Versed (2mg/2ml), 2 mg, IV, at 09:45  Fentanyl (1OOmcg/2 ml), 50 mcg, IV, at 09:45  1% Lidocaine, 0 1 ml, subcutaneously, at  09:48  Heparin 1000 units/ml, 4,000 units, IV, at 09:50  Nitroglycerin (200mcg/ml), 200 mcg, at 09:51  CONTRAST GIVEN: 30 ml Omnipaque (350mg I /ml)  RADIATION EXPOSURE: Fluoroscopy time: 1 48 min  CORONARY VESSELS:   --  Left main: Normal   --  LAD: There was minor plaque of the distal vessel  The major D1 branch was normal  There was minor plaque of the small D2 branch    --  Circumflex: Normal  The major OM branch was also normal   --  RCA: The vessel was normal sized and dominant, giving rise to the PDA and two posterolateral branches  There was mild non-obstructive plaque  NOTE: Right radial access was employed  There were no complications  Prepared and signed by    Angelia Elliott MD  Signed 2020 10:55:01    CC: Dr Andrey Cheung    Study diagram    Hemodynamic tables    Pressures:  Baseline  Pressures:  - HR: 77  Pressures:  - Rhythm:  Pressures:  -- Aortic Pressure (S/D/M): 118/69/68    Outputs:  Baseline  Outputs:  -- CALCULATIONS: [age]  Outputs:  -- CALCULATIONS: Body Surface Area: 1 69  Outputs:  -- CALCULATIONS: Height in cm: 157 00  Outputs:  -- CALCULATIONS: Sex: Male  Outputs:  -- CALCULATIONS: Weight in k 50         --------------------------------------------------------------------------------    ECGs:  Results for orders placed or performed in visit on 22   POCT ECG    Impression    Sinus tachycardia 102 bpm, otherwise normal ECG        Lab Results   Component Value Date    WBC 11 07 (H) 2020    HGB 15 9 2020    HCT 51 3 (H) 2020    MCV 88 2020     2020      Lab Results   Component Value Date    SODIUM 140 2020    K 4 8 2020     2020    CO2 28 2020    BUN 16 2020    CREATININE 0 94 2020    CALCIUM 9 5 2020      Lab Results   Component Value Date    HGBA1C 6 1 (H) 2020      Lab Results   Component Value Date    CHOL 230 (H) 2017    CHOL 264 (H) 10/26/2016    CHOL 282 (H) 2016     Lab Results   Component Value Date    HDL 23 (L) 2020    HDL 35 (L) 2019    HDL 41 2017     Lab Results   Component Value Date    LDLCALC 123 (H) 2020    LDLCALC 222 (H) 2019     Lab Results   Component Value Date    TRIG 183 (H) 2020    TRIG 121 2019    TRIG 124 2017         1  Precordial chest pain    2  Coronary artery disease without angina pectoris, unspecified vessel or lesion type, unspecified whether native or transplanted heart    3   Essential hypertension  -     POCT ECG    4  Mixed hyperlipidemia  -     Lipid Panel with Direct LDL reflex; Future; Expected date: 07/19/2022    5  Tobacco use        IMPRESSION:  · History of precordial chest 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 hypertension  · Pharmacologic nuclear stress test negative for myocardial ischemia, April 2020  · LVEF 55%, AV sclerosis, MAC, trace MR, April 2020  · CAD s/p cath w/ minimal plaque of the LAD, D2 and RCA, May 2020  · Hypertension  · Dyslipidemia w/  mg/dL and non- mg/dL, December 2020  · History of Lyme disease s/p completion of doxycycline  · Overweight  · Prediabetes  · Tobacco abuse  · Family history of CAD with mother    PLAN:  It was a pleasure to see Bladimir Sellers in the office today for follow-up evaluation  He has a known history of malignant hypertension but blood pressure is well controlled in the office today  He has been tolerating his amlodipine 10 mg daily without any reported adverse effects  He has also been taking his high-intensity statin medication  He has no symptoms concerning for angina and no signs or symptoms of heart failure  He examines to be euvolemic in the office today  ECG is nonischemic with only sinus tachycardia  He can perform greater than 4 Mets of activity on a daily basis without any exertional symptoms  Overall, he feels to be in his usual state of health  Based on his clinical presentation, I have the following recommendations:    1  Recommend aggressive risk factor and lifestyle modifications  2  Would encourage 30 minutes a day, 5 days a week of moderate intensity activity to build cardiovascular endurance  3  Recommend a heart healthy diet low in sodium and carbohydrate  We have previously discussed the [address] and Vegan diet  I have again encouraged this  4  Continue Lipitor 80 mg daily  Will repeat lipid panel in 6 months prior to our follow-up appointment  5  No changes to his medications at this time  6   No additional CV testing at this time  7  We will follow up with him in 6 months to review his lipid panel and reassess his symptoms and blood pressure  If everything is stable at that time, we will see each other once a year  As always, please do not hesitate to call with any questions  Portions of the record may have been created with voice recognition software  Occasional wrong word or "sound a like" substitutions may have occurred due to the inherent limitations of voice recognition software  Read the chart carefully and recognize, using context, where substitutions have occurred        Signed: Jacqueline Saul DO, Samual Piano

## 2022-01-19 ENCOUNTER — OFFICE VISIT (OUTPATIENT)
Dept: CARDIOLOGY CLINIC | Facility: CLINIC | Age: 62
End: 2022-01-19
Payer: COMMERCIAL

## 2022-01-19 VITALS
HEART RATE: 102 BPM | WEIGHT: 167.2 LBS | BODY MASS INDEX: 30.58 KG/M2 | DIASTOLIC BLOOD PRESSURE: 78 MMHG | SYSTOLIC BLOOD PRESSURE: 130 MMHG

## 2022-01-19 DIAGNOSIS — R07.2 PRECORDIAL CHEST PAIN: Primary | ICD-10-CM

## 2022-01-19 DIAGNOSIS — E78.5 HYPERLIPIDEMIA, UNSPECIFIED HYPERLIPIDEMIA TYPE: ICD-10-CM

## 2022-01-19 DIAGNOSIS — E78.2 MIXED HYPERLIPIDEMIA: ICD-10-CM

## 2022-01-19 DIAGNOSIS — Z72.0 TOBACCO USE: ICD-10-CM

## 2022-01-19 DIAGNOSIS — I10 ESSENTIAL HYPERTENSION: ICD-10-CM

## 2022-01-19 DIAGNOSIS — I25.10 CORONARY ARTERY DISEASE WITHOUT ANGINA PECTORIS, UNSPECIFIED VESSEL OR LESION TYPE, UNSPECIFIED WHETHER NATIVE OR TRANSPLANTED HEART: ICD-10-CM

## 2022-01-19 PROCEDURE — 99214 OFFICE O/P EST MOD 30 MIN: CPT | Performed by: INTERNAL MEDICINE

## 2022-01-19 PROCEDURE — 93000 ELECTROCARDIOGRAM COMPLETE: CPT | Performed by: INTERNAL MEDICINE

## 2022-01-19 RX ORDER — AMLODIPINE BESYLATE 10 MG/1
10 TABLET ORAL DAILY
Qty: 90 TABLET | Refills: 2 | Status: SHIPPED | OUTPATIENT
Start: 2022-01-19 | End: 2022-04-19

## 2022-01-19 RX ORDER — ATORVASTATIN CALCIUM 80 MG/1
80 TABLET, FILM COATED ORAL DAILY
Qty: 90 TABLET | Refills: 2 | Status: SHIPPED | OUTPATIENT
Start: 2022-01-19

## 2022-05-23 ENCOUNTER — TELEPHONE (OUTPATIENT)
Dept: FAMILY MEDICINE CLINIC | Facility: CLINIC | Age: 62
End: 2022-05-23

## 2022-05-23 NOTE — TELEPHONE ENCOUNTER
----- Message from Eufemia Fuchs MA sent at 5/19/2022  7:23 PM EDT -----  Regarding: Need appointment  Please contact patient to schedule follow up appointment with any provider

## 2022-10-18 NOTE — PROGRESS NOTES
[facility]      NAME: Gudelia Mendoza  AGE: 62 y o  SEX: male  : 1960   MRN: [id number]    DATE: 2020  TIME: 2:48 PM    Assessment and Plan     Problem List Items Addressed This Visit     Acute left-sided thoracic back pain    Chronic neck pain       Still with considerable left-sided neck pain that is radiating into his left arm and fingers  X-rays revealed degenerative changes  Will refer to physical therapy  Continue meloxicam as needed  I would like to see him back in 4-6 weeks  He may need MRI         Relevant Orders    Ambulatory referral to Physical Therapy    Arthralgia of both hands      Overall arthralgias in hands and back have improved somewhat since starting doxycycline a few weeks ago  Patient had a positive Lyme titer at that time  I would like to see him back in 4-6 weeks  If symptoms persist, will refer to Rheumatology         Lyme disease - Primary      Lyme titer from  was positive  Patient started doxycycline a few weeks ago  He still has approximately 1 week left  Overall his symptoms have improved somewhat, but still with some myalgias/arthralgias  Will recheck him again in 4-6 weeks                   Return to office in: recheck 4-6 weeks    Chief Complaint     Chief Complaint   Patient presents with    Follow-up     1m follow up to L side neck, thoracic and hand pain  Pt states he is still having ongoing L shoulder pain        History of Present Illness       Patient presents for recheck today  He presents with his friend who is translating for him  Overall he is starting to feel better since starting doxycycline  He is not as achy as before  He still has considerable neck pain that is radiating into his left arm and fingers  Patient would like to discuss results of x-rays of neck, thoracic spine, hands, and lab results        The following portions of the patient's history were reviewed and updated as appropriate: Consulting palliative care  Advance Care Planning     Date: 10/17/2022     Code Status  In light of the patients advanced and life limiting illness,I engaged the the healthcare power of   in a conversation about the patient's preferences for care  at the very end of life. Per POA patient wishes to have a natural, peaceful death.  Along those lines, the patient does not wish to have CPR or other invasive treatments performed when his heart and/or breathing stops. I communicated to the healthcare power of   that a DNR order would be placed in his medical record to reflect this preference.   previous provider spent a total of 15 minutes engaging the patient in this advance care planning discussion.           allergies, current medications, past family history, past medical history, past social history, past surgical history and problem list     Review of Systems   Review of Systems   Respiratory: Negative  Cardiovascular: Negative  Gastrointestinal: Negative  Genitourinary: Negative  Musculoskeletal: Positive for arthralgias  Active Problem List     Patient Active Problem List   Diagnosis    Cyst of epididymis    Acute left-sided thoracic back pain    Chronic neck pain    Arthralgia of both hands    Lyme disease       Objective   /80 (BP Location: Left arm, Patient Position: Sitting, Cuff Size: Adult)   Pulse 95   Temp 97 9 °F (36 6 °C) (Tympanic)   Resp 17   Wt 66 2 kg (146 lb)   SpO2 97%   BMI 26 20 kg/m²     Physical Exam    Pertinent Laboratory/Diagnostic Studies:   lab results from December 28th reviewed    X-rays from December 26th reviewed    Current Medications     Current Outpatient Medications:     aspirin 81 MG tablet, Take 1 tablet by mouth daily, Disp: , Rfl:     atorvastatin (LIPITOR) 80 mg tablet, Take 1 tablet (80 mg total) by mouth daily, Disp: 90 tablet, Rfl: 0    meloxicam (MOBIC) 15 mg tablet, Take 1 tablet (15 mg total) by mouth daily, Disp: 30 tablet, Rfl: 0    vitamin B-12 (CYANOCOBALAMIN) 2000 MCG TABS, Take 1 tablet by mouth daily, Disp: , Rfl:     Health Maintenance     Health Maintenance   Topic Date Due    Hepatitis C Screening  1960    CRC Screening: Colonoscopy  1960    BMI: Followup Plan  07/07/1976    Annual Physical  07/07/1976    Influenza Vaccine  03/03/2020 (Originally 7/1/2019)    HIV Screening  03/05/2020 (Originally 7/7/1973)    Pneumococcal Vaccine: Pediatrics (0 to 5 Years) and At-Risk Patients (6 to 59 Years) (1 of 1 - PPSV23) 06/26/2020 (Originally 7/7/1964)    Depression Screening PHQ  12/26/2020    BMI: Adult  12/26/2020    Pneumococcal Vaccine: 65+ Years (1 of 2 - PCV13) 07/07/2023    DTaP,Tdap,and Td Vaccines (2 - Td) 07/02/2025    HIB Vaccine  Aged Out    Hepatitis B Vaccine  Aged Out    IPV Vaccine  Aged Out    Hepatitis A Vaccine  Aged Out    Meningococcal ACWY Vaccine  Aged Out    HPV Vaccine  Aged Out     Immunization History   Administered Date(s) Administered    Tdap 07/02/2015       Son Lopez DO  [facility]

## 2023-02-23 DIAGNOSIS — I10 ESSENTIAL HYPERTENSION: ICD-10-CM

## 2023-02-23 RX ORDER — AMLODIPINE BESYLATE 10 MG/1
10 TABLET ORAL DAILY
Qty: 30 TABLET | Refills: 0 | Status: SHIPPED | OUTPATIENT
Start: 2023-02-23 | End: 2023-05-24

## 2023-03-08 ENCOUNTER — OFFICE VISIT (OUTPATIENT)
Dept: CARDIOLOGY CLINIC | Facility: CLINIC | Age: 63
End: 2023-03-08

## 2023-03-08 VITALS
HEART RATE: 91 BPM | WEIGHT: 171.4 LBS | BODY MASS INDEX: 31.35 KG/M2 | SYSTOLIC BLOOD PRESSURE: 140 MMHG | DIASTOLIC BLOOD PRESSURE: 80 MMHG

## 2023-03-08 DIAGNOSIS — I25.10 CORONARY ARTERY DISEASE WITHOUT ANGINA PECTORIS, UNSPECIFIED VESSEL OR LESION TYPE, UNSPECIFIED WHETHER NATIVE OR TRANSPLANTED HEART: Primary | ICD-10-CM

## 2023-03-08 DIAGNOSIS — E78.2 MIXED HYPERLIPIDEMIA: ICD-10-CM

## 2023-03-08 DIAGNOSIS — Z72.0 TOBACCO USE: ICD-10-CM

## 2023-03-08 DIAGNOSIS — I10 ESSENTIAL HYPERTENSION: ICD-10-CM

## 2023-03-08 DIAGNOSIS — E78.5 HYPERLIPIDEMIA, UNSPECIFIED HYPERLIPIDEMIA TYPE: ICD-10-CM

## 2023-03-08 RX ORDER — LOSARTAN POTASSIUM 25 MG/1
25 TABLET ORAL DAILY
Qty: 90 TABLET | Refills: 3 | Status: SHIPPED | OUTPATIENT
Start: 2023-03-08 | End: 2023-06-06

## 2023-03-08 RX ORDER — ATORVASTATIN CALCIUM 80 MG/1
80 TABLET, FILM COATED ORAL DAILY
Qty: 90 TABLET | Refills: 3 | Status: SHIPPED | OUTPATIENT
Start: 2023-03-08

## 2023-03-17 DIAGNOSIS — I10 ESSENTIAL HYPERTENSION: ICD-10-CM

## 2023-03-17 RX ORDER — AMLODIPINE BESYLATE 10 MG/1
TABLET ORAL
Qty: 90 TABLET | Refills: 1 | Status: SHIPPED | OUTPATIENT
Start: 2023-03-17

## 2023-03-24 ENCOUNTER — CLINICAL SUPPORT (OUTPATIENT)
Dept: CARDIOLOGY CLINIC | Facility: CLINIC | Age: 63
End: 2023-03-24

## 2023-03-24 VITALS
HEART RATE: 95 BPM | WEIGHT: 171 LBS | BODY MASS INDEX: 31.47 KG/M2 | DIASTOLIC BLOOD PRESSURE: 86 MMHG | SYSTOLIC BLOOD PRESSURE: 154 MMHG | HEIGHT: 62 IN

## 2023-03-24 DIAGNOSIS — I10 ESSENTIAL HYPERTENSION: Primary | ICD-10-CM

## 2023-03-24 NOTE — PROGRESS NOTES
Patient came in for BP check after restarting his Losartan 25 mgs one daily  He had not been taking it when he was at his office visit with Dr Nadine Ribeiro on 3/8/23  His BP today is still elevated at 154/86 heart rate 95  He takes his BP at home and states he is getting the same type of readings  He is asymptomatic  I will contact Dr Nadine Ribeiro with this information and get back to patient with advisement

## 2023-03-28 ENCOUNTER — TELEPHONE (OUTPATIENT)
Dept: CARDIOLOGY CLINIC | Facility: CLINIC | Age: 63
End: 2023-03-28

## 2023-03-28 NOTE — TELEPHONE ENCOUNTER
Placed call to patient  He was already aware of this  He started the 50mg today  Has nurse visit bp check appt for 4/10/23

## 2023-03-28 NOTE — TELEPHONE ENCOUNTER
----- Message from Laina Matamoros DO sent at 3/27/2023  6:37 AM EDT -----  Would increase the patient's losartan to 50 mg daily  He can double up on his 25 mg tablets of the losartan for now  He is supposed to obtain blood work on the losartan  Calling in a prescription for the increased dose  Offer blood pressure check in 2 weeks after increasing to 50 mg daily  Thank you

## 2023-04-11 ENCOUNTER — CLINICAL SUPPORT (OUTPATIENT)
Dept: CARDIOLOGY CLINIC | Facility: CLINIC | Age: 63
End: 2023-04-11

## 2023-04-11 VITALS — HEART RATE: 96 BPM | DIASTOLIC BLOOD PRESSURE: 66 MMHG | SYSTOLIC BLOOD PRESSURE: 120 MMHG

## 2023-04-11 DIAGNOSIS — I10 ESSENTIAL HYPERTENSION: Primary | ICD-10-CM

## 2023-04-11 NOTE — PROGRESS NOTES
Lizzy Samuels is here for a nurse visit/BP check after increase in BP medication to Losartan 50 mgs qd from 25 mgs  The plan is to continue going up on antihypertensives until BP is well controlled  BP today is 120/66 and HR is 96  Per Dr Viviane Mueller, BP good    No need to do anything different at this time and Lizzy Samuels is to continue with the Losartan 50 mgs qd

## 2023-04-21 ENCOUNTER — TELEPHONE (OUTPATIENT)
Dept: FAMILY MEDICINE CLINIC | Facility: CLINIC | Age: 63
End: 2023-04-21

## 2023-04-21 NOTE — TELEPHONE ENCOUNTER
1st attempt    Last OV 5/26/21    [facility] for patient to call back to schedule, or let us know if he has established care elsewhere

## 2023-07-27 ENCOUNTER — OFFICE VISIT (OUTPATIENT)
Dept: FAMILY MEDICINE CLINIC | Facility: CLINIC | Age: 63
End: 2023-07-27
Payer: MEDICARE

## 2023-07-27 VITALS
OXYGEN SATURATION: 96 % | TEMPERATURE: 97.3 F | DIASTOLIC BLOOD PRESSURE: 90 MMHG | HEART RATE: 83 BPM | WEIGHT: 162 LBS | HEIGHT: 62 IN | SYSTOLIC BLOOD PRESSURE: 142 MMHG | BODY MASS INDEX: 29.81 KG/M2

## 2023-07-27 DIAGNOSIS — Z13.29 SCREENING FOR THYROID DISORDER: ICD-10-CM

## 2023-07-27 DIAGNOSIS — G51.0 FACIAL PARALYSIS: Primary | ICD-10-CM

## 2023-07-27 DIAGNOSIS — Z13.0 SCREENING, ANEMIA, DEFICIENCY, IRON: ICD-10-CM

## 2023-07-27 DIAGNOSIS — Z13.220 SCREENING, LIPID: ICD-10-CM

## 2023-07-27 DIAGNOSIS — Z13.1 SCREENING FOR DIABETES MELLITUS: ICD-10-CM

## 2023-07-27 DIAGNOSIS — Z11.59 NEED FOR HEPATITIS C SCREENING TEST: ICD-10-CM

## 2023-07-27 PROCEDURE — 99214 OFFICE O/P EST MOD 30 MIN: CPT | Performed by: NURSE PRACTITIONER

## 2023-07-27 NOTE — LETTER
July 27, 2023     Patient: Tahira Pollock  YOB: 1960  Date of Visit: 7/27/2023      To Whom it May Concern:    Tahira Pollock is under my professional care. Isis Garcia was seen in my office on 7/27/2023. Please excuse Isis Garcia from work through 8/14, with a tentative return date of 8/15/2023. If you have any questions or concerns, please don't hesitate to call.          Sincerely,          ROME Casas        CC: No Recipients

## 2023-07-27 NOTE — ASSESSMENT & PLAN NOTE
Presents with history of facial paralysis - lasting 2 days (3.5 weeks ago). No further symptoms - remains asymptomatic today. Differential considered: Evans Palsy, stroke, TIA. Physical and neuro exam unremarkable in office today. Note PMH: CAD, HTN. Appreciate cardiology most recent note - 3/2023. Note smoking history - reports smoking <1ppd.   Work up today with labs: CBC, CMP, TSH  Check head CT  Routine labs ordered as well   F/u in office for physical once completed to review  Strict ER precautions with any further symptoms

## 2023-07-28 ENCOUNTER — APPOINTMENT (OUTPATIENT)
Dept: LAB | Facility: CLINIC | Age: 63
End: 2023-07-28
Payer: COMMERCIAL

## 2023-07-28 DIAGNOSIS — Z13.29 SCREENING FOR THYROID DISORDER: ICD-10-CM

## 2023-07-28 DIAGNOSIS — Z13.0 SCREENING, ANEMIA, DEFICIENCY, IRON: ICD-10-CM

## 2023-07-28 DIAGNOSIS — Z13.220 SCREENING, LIPID: ICD-10-CM

## 2023-07-28 DIAGNOSIS — Z13.1 SCREENING FOR DIABETES MELLITUS: ICD-10-CM

## 2023-07-28 DIAGNOSIS — G51.0 FACIAL PARALYSIS: ICD-10-CM

## 2023-07-28 DIAGNOSIS — Z11.59 NEED FOR HEPATITIS C SCREENING TEST: ICD-10-CM

## 2023-07-28 LAB
ALBUMIN SERPL BCP-MCNC: 3.4 G/DL (ref 3.5–5)
ALP SERPL-CCNC: 86 U/L (ref 46–116)
ALT SERPL W P-5'-P-CCNC: 34 U/L (ref 12–78)
ANION GAP SERPL CALCULATED.3IONS-SCNC: 3 MMOL/L
AST SERPL W P-5'-P-CCNC: 16 U/L (ref 5–45)
BASOPHILS # BLD AUTO: 0.02 THOUSANDS/ÂΜL (ref 0–0.1)
BASOPHILS NFR BLD AUTO: 0 % (ref 0–1)
BILIRUB SERPL-MCNC: 0.37 MG/DL (ref 0.2–1)
BUN SERPL-MCNC: 15 MG/DL (ref 5–25)
CALCIUM ALBUM COR SERPL-MCNC: 9.5 MG/DL (ref 8.3–10.1)
CALCIUM SERPL-MCNC: 9 MG/DL (ref 8.3–10.1)
CHLORIDE SERPL-SCNC: 112 MMOL/L (ref 96–108)
CHOLEST SERPL-MCNC: 227 MG/DL
CO2 SERPL-SCNC: 27 MMOL/L (ref 21–32)
CREAT SERPL-MCNC: 0.89 MG/DL (ref 0.6–1.3)
EOSINOPHIL # BLD AUTO: 0.22 THOUSAND/ÂΜL (ref 0–0.61)
EOSINOPHIL NFR BLD AUTO: 3 % (ref 0–6)
ERYTHROCYTE [DISTWIDTH] IN BLOOD BY AUTOMATED COUNT: 15.8 % (ref 11.6–15.1)
GFR SERPL CREATININE-BSD FRML MDRD: 89 ML/MIN/1.73SQ M
GLUCOSE SERPL-MCNC: 85 MG/DL (ref 65–140)
HCT VFR BLD AUTO: 48 % (ref 36.5–49.3)
HDLC SERPL-MCNC: 23 MG/DL
HGB BLD-MCNC: 15 G/DL (ref 12–17)
IMM GRANULOCYTES # BLD AUTO: 0.04 THOUSAND/UL (ref 0–0.2)
IMM GRANULOCYTES NFR BLD AUTO: 1 % (ref 0–2)
LDLC SERPL CALC-MCNC: 140 MG/DL (ref 0–100)
LYMPHOCYTES # BLD AUTO: 1.93 THOUSANDS/ÂΜL (ref 0.6–4.47)
LYMPHOCYTES NFR BLD AUTO: 24 % (ref 14–44)
MCH RBC QN AUTO: 26.9 PG (ref 26.8–34.3)
MCHC RBC AUTO-ENTMCNC: 31.3 G/DL (ref 31.4–37.4)
MCV RBC AUTO: 86 FL (ref 82–98)
MONOCYTES # BLD AUTO: 0.6 THOUSAND/ÂΜL (ref 0.17–1.22)
MONOCYTES NFR BLD AUTO: 8 % (ref 4–12)
NEUTROPHILS # BLD AUTO: 5.17 THOUSANDS/ÂΜL (ref 1.85–7.62)
NEUTS SEG NFR BLD AUTO: 64 % (ref 43–75)
NONHDLC SERPL-MCNC: 204 MG/DL
NRBC BLD AUTO-RTO: 0 /100 WBCS
PLATELET # BLD AUTO: 292 THOUSANDS/UL (ref 149–390)
PMV BLD AUTO: 11.8 FL (ref 8.9–12.7)
POTASSIUM SERPL-SCNC: 4.7 MMOL/L (ref 3.5–5.3)
PROT SERPL-MCNC: 7.2 G/DL (ref 6.4–8.4)
RBC # BLD AUTO: 5.58 MILLION/UL (ref 3.88–5.62)
SODIUM SERPL-SCNC: 142 MMOL/L (ref 135–147)
TRIGL SERPL-MCNC: 318 MG/DL
TSH SERPL DL<=0.05 MIU/L-ACNC: 0.81 UIU/ML (ref 0.45–4.5)
WBC # BLD AUTO: 7.98 THOUSAND/UL (ref 4.31–10.16)

## 2023-07-28 PROCEDURE — 80053 COMPREHEN METABOLIC PANEL: CPT

## 2023-07-28 PROCEDURE — 86803 HEPATITIS C AB TEST: CPT

## 2023-07-28 PROCEDURE — 85025 COMPLETE CBC W/AUTO DIFF WBC: CPT

## 2023-07-28 PROCEDURE — 84443 ASSAY THYROID STIM HORMONE: CPT

## 2023-07-28 PROCEDURE — 80061 LIPID PANEL: CPT

## 2023-07-28 PROCEDURE — 36415 COLL VENOUS BLD VENIPUNCTURE: CPT

## 2023-07-29 LAB — HCV AB SER QL: NORMAL

## 2023-08-10 ENCOUNTER — HOSPITAL ENCOUNTER (OUTPATIENT)
Dept: CT IMAGING | Facility: HOSPITAL | Age: 63
Discharge: HOME | End: 2023-08-10
Payer: MEDICARE

## 2023-08-10 DIAGNOSIS — G51.0 FACIAL PARALYSIS: ICD-10-CM

## 2023-08-10 PROCEDURE — G1004 CDSM NDSC: HCPCS

## 2023-08-10 PROCEDURE — 70496 CT ANGIOGRAPHY HEAD: CPT

## 2023-08-10 RX ADMIN — IOHEXOL 85 ML: 350 INJECTION, SOLUTION INTRAVENOUS at 14:58

## 2023-08-14 NOTE — PROGRESS NOTES
Assessment/Plan:    Cyst of epididymis  Patient has a ?mass on his R testicle x a few years  In the past few mos, mass has increased in size and become painful  No pain w/ urination  No fevers/chills  Upon examination, patient exhibits soft mildly tender mass behind right testicle  Lesion is most likely a benign epididymal appendage  But I would like to confirm with scrotal ultrasound  Will call with results         Diagnoses and all orders for this visit:    Cyst of epididymis  -     US scrotum and testicles; Future    Other orders  -     aspirin 81 MG tablet; Take 1 tablet by mouth daily  -     vitamin B-12 (CYANOCOBALAMIN) 2000 MCG TABS; Take 1 tablet by mouth daily  -     atorvastatin (LIPITOR) 80 mg tablet; Take 80 mg by mouth  -     simvastatin (ZOCOR) 40 mg tablet; Take 1 tablet by mouth          Subjective:      Patient ID: Johanna Sanchez is a 59 y o  male  Patient has a ?mass on his R testicle x a few years  In the past few mos, mass has increased in size and become painful  No pain w/ urination  No fevers/chills           The following portions of the patient's history were reviewed and updated as appropriate: allergies, current medications, past family history, past medical history, past social history, past surgical history and problem list     Review of Systems      Objective:      /80 (BP Location: Left arm, Patient Position: Sitting, Cuff Size: Adult)   Pulse 85   Temp 97 9 °F (36 6 °C) (Tympanic)   Resp 14   Ht 5' 4" (1 626 m)   Wt 67 3 kg (148 lb 4 8 oz)   SpO2 96%   BMI 25 46 kg/m²          Physical Exam   Genitourinary:   Genitourinary Comments:  Soft mildly tender mass behind right testicle
 used

## 2023-08-16 ENCOUNTER — TELEPHONE (OUTPATIENT)
Dept: FAMILY MEDICINE CLINIC | Facility: CLINIC | Age: 63
End: 2023-08-16

## 2023-08-16 NOTE — TELEPHONE ENCOUNTER
"Hello, this message is in regards to Tita Antonio [place]. He went in for a [facility] CT scan and he's awaiting results, which I think they've come in. We're trying to figure out whether he's supposed to have made an appointment to come in and follow up on that or if he's going to be getting a phone call to go over the results. One of his concerns is whether or not he can go back to work and his boss is reaching out daily to see whether or not he can come back to work. And so we were just wanting to know what our next steps are. Again, Giselle. I post that [place] is his name, date of birth 1960 and you can reach me at [phone]. Thank you. My name is Sunny Bui, his niece and I am on the account. Thanks."    Pt's niece LM wanting to know if pt needed a f/u scheduled or if they should wait for result call with further direction. Pt currently out of work and looking for further direction there as well.

## 2023-08-17 NOTE — TELEPHONE ENCOUNTER
NICOLEM with pt's niece requesting that she call the office to scheduled a f/u appt for pt to review CT and and possibly clear back to work

## 2023-08-24 ENCOUNTER — OFFICE VISIT (OUTPATIENT)
Dept: FAMILY MEDICINE CLINIC | Facility: CLINIC | Age: 63
End: 2023-08-24
Payer: MEDICARE

## 2023-08-24 VITALS
OXYGEN SATURATION: 99 % | BODY MASS INDEX: 30.14 KG/M2 | HEART RATE: 86 BPM | SYSTOLIC BLOOD PRESSURE: 128 MMHG | WEIGHT: 163.8 LBS | HEIGHT: 62 IN | DIASTOLIC BLOOD PRESSURE: 82 MMHG | TEMPERATURE: 97.9 F

## 2023-08-24 DIAGNOSIS — Z90.89 HX OF MASTOIDECTOMY: ICD-10-CM

## 2023-08-24 DIAGNOSIS — I67.82 ISCHEMIC CHANGES ON HEAD CT: ICD-10-CM

## 2023-08-24 DIAGNOSIS — R20.0 LEFT FACIAL NUMBNESS: Primary | ICD-10-CM

## 2023-08-24 PROCEDURE — 99214 OFFICE O/P EST MOD 30 MIN: CPT | Performed by: NURSE PRACTITIONER

## 2023-08-24 NOTE — ASSESSMENT & PLAN NOTE
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    A similar sized mass was described on the outside MRI study from 2014  Note to remain out of work-patient planning to retire  Again, discussed strict ER precautions with any acute change

## 2023-08-24 NOTE — PROGRESS NOTES
[facility]    ASSESSMENT AND PLAN     1. 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 A similar sized mass was described on the outside MRI study from 2014  Note to remain out of work-patient planning to retire  Again, discussed strict ER precautions with any acute change      Orders:  -     MRI brain w wo contrast; Future; Expected date: 08/24/2023  -     Ambulatory Referral to Neurology; Future  -     Ambulatory Referral to Otolaryngology; Future    2. Ischemic changes on head CT  Assessment & Plan:  Continue atorvastatin 80; ASA 81  Referral to Neuro        Orders:  -     MRI brain w wo contrast; Future; Expected date: 08/24/2023  -     Ambulatory Referral to Neurology; Future    3. Hx of mastoidectomy  -     Ambulatory Referral to Otolaryngology; Future         SUBJECTIVE       Patient ID: Carina Lucia is a 70 y.o. male. Chief Complaint   Patient presents with   • Results     Discuss CT and discuss going back to work       HISTORY OF PRESENT ILLNESS    Presents with niece for follow-up. Has had no further symptoms until recently. Reports some recent intermittent mild numbness along the hairline and around his ear-left side. No further facial drooping. No vision change. No extremity weakness.   Note history left mastoidectomy-1982        The following portions of the patient's history were reviewed and updated as appropriate: allergies, current medications, past family history, past medical history, past social history, past surgical history, and problem list.    REVIEW OF SYSTEMS  Review of Systems   Constitutional: Negative for activity change, appetite change and fatigue. HENT: Negative. Respiratory: Negative. Cardiovascular: Negative. Gastrointestinal: Negative. Genitourinary: Negative. Neurological: Positive for numbness (.  Left facial as in HPI). Negative for dizziness, syncope, facial asymmetry, weakness, light-headedness and headaches. Psychiatric/Behavioral: Negative. OBJECTIVE      VITAL SIGNS  /82 (BP Location: Left arm, Patient Position: Sitting, Cuff Size: Adult)   Pulse 86   Temp 97.9 °F (36.6 °C)   Ht 5' 2" (1.575 m)   Wt 74.3 kg (163 lb 12.8 oz)   SpO2 99%   BMI 29.96 kg/m²     CURRENT MEDICATIONS    Current Outpatient Medications:   •  amLODIPine (NORVASC) 10 mg tablet, TAKE 1 TABLET BY MOUTH EVERY DAY, Disp: 90 tablet, Rfl: 1  •  aspirin 81 MG tablet, Take 1 tablet by mouth daily, Disp: , Rfl:   •  atorvastatin (LIPITOR) 80 mg tablet, Take 1 tablet (80 mg total) by mouth daily, Disp: 90 tablet, Rfl: 3  •  losartan (COZAAR) 25 mg tablet, Take 1 tablet (25 mg total) by mouth daily (Patient taking differently: Take 50 mg by mouth daily), Disp: 90 tablet, Rfl: 3  •  vitamin B-12 (CYANOCOBALAMIN) 2000 MCG TABS, Take 1 tablet by mouth daily, Disp: , Rfl:   No current facility-administered medications for this visit. PHYSICAL EXAMINATION   Physical Exam  Vitals and nursing note reviewed. Chaperone present: Niece. Constitutional:       General: He is not in acute distress. Appearance: Normal appearance. He is well-developed and well-groomed. He is not ill-appearing. HENT:      Head: Normocephalic. Right Ear: Tympanic membrane and ear canal normal.      Left Ear: Ear canal normal. No mastoid tenderness. Tympanic membrane is scarred. Nose: Nose normal.      Mouth/Throat:      Pharynx: Oropharynx is clear. Eyes:      General: No visual field deficit. Pulmonary:      Effort: Pulmonary effort is normal. No respiratory distress.    Skin:     General: Skin is warm and dry. Neurological:      General: No focal deficit present. Mental Status: He is alert and oriented to person, place, and time. Cranial Nerves: No cranial nerve deficit, dysarthria or facial asymmetry. Sensory: No sensory deficit. Motor: No weakness. Coordination: Coordination is intact. Gait: Gait is intact.    Psychiatric:         Attention and Perception: Attention normal.         Mood and Affect: Mood normal.         Behavior: Behavior normal.         Cognition and Memory: Cognition normal.         Judgment: Judgment normal.

## 2023-08-24 NOTE — LETTER
August 24, 2023     Patient: Nona Monet  YOB: 1960  Date of Visit: 8/24/2023      To Whom it May Concern:    Nona Monet is under my professional care. Robert Blue was seen in my office on 8/24/2023. Robert Blue will remain out of work until further notice. If you have any questions or concerns, please don't hesitate to call.          Sincerely,          ROME Hudson        CC: No Recipients

## 2023-08-29 ENCOUNTER — TELEPHONE (OUTPATIENT)
Dept: FAMILY MEDICINE CLINIC | Facility: CLINIC | Age: 63
End: 2023-08-29

## 2023-09-01 ENCOUNTER — HOSPITAL ENCOUNTER (OUTPATIENT)
Dept: MRI IMAGING | Facility: HOSPITAL | Age: 63
Discharge: HOME | End: 2023-09-01
Payer: MEDICARE

## 2023-09-01 DIAGNOSIS — R20.0 LEFT FACIAL NUMBNESS: ICD-10-CM

## 2023-09-01 DIAGNOSIS — I67.82 ISCHEMIC CHANGES ON HEAD CT: ICD-10-CM

## 2023-09-01 PROCEDURE — 70553 MRI BRAIN STEM W/O & W/DYE: CPT

## 2023-09-01 PROCEDURE — A9585 GADOBUTROL INJECTION: HCPCS | Performed by: NURSE PRACTITIONER

## 2023-09-01 PROCEDURE — G1004 CDSM NDSC: HCPCS

## 2023-09-01 RX ORDER — GADOBUTROL 604.72 MG/ML
7 INJECTION INTRAVENOUS
Status: COMPLETED | OUTPATIENT
Start: 2023-09-01 | End: 2023-09-01

## 2023-09-01 RX ADMIN — GADOBUTROL 7 ML: 604.72 INJECTION INTRAVENOUS at 09:24

## 2023-09-06 ENCOUNTER — TELEPHONE (OUTPATIENT)
Dept: NEUROLOGY | Facility: CLINIC | Age: 63
End: 2023-09-06

## 2023-09-06 NOTE — TELEPHONE ENCOUNTER
Called and spoke with patient steffi Shahidfrancisco j Benoit, to offer an appointment with Dr. Spencer Hampton in the [facility]. Appointment date is for 9/13 at 6 am. Steffi asked for appointment information to be sent via sms.

## 2023-09-06 NOTE — TELEPHONE ENCOUNTER
Called and left patient a voicemail to schedule appointment to see Dr. Eliana Monge. Left a call back number for patient.

## 2023-09-13 ENCOUNTER — CONSULT (OUTPATIENT)
Dept: NEUROLOGY | Facility: CLINIC | Age: 63
End: 2023-09-13
Payer: MEDICARE

## 2023-09-13 VITALS
HEIGHT: 62 IN | DIASTOLIC BLOOD PRESSURE: 80 MMHG | SYSTOLIC BLOOD PRESSURE: 130 MMHG | HEART RATE: 86 BPM | WEIGHT: 164.4 LBS | BODY MASS INDEX: 30.25 KG/M2

## 2023-09-13 DIAGNOSIS — I67.82 ISCHEMIC CHANGES ON HEAD CT: ICD-10-CM

## 2023-09-13 DIAGNOSIS — Z86.73 HISTORY OF CVA (CEREBROVASCULAR ACCIDENT): ICD-10-CM

## 2023-09-13 DIAGNOSIS — R20.0 LEFT FACIAL NUMBNESS: ICD-10-CM

## 2023-09-13 DIAGNOSIS — R06.83 SNORING: Primary | ICD-10-CM

## 2023-09-13 PROCEDURE — 99204 OFFICE O/P NEW MOD 45 MIN: CPT | Performed by: PSYCHIATRY & NEUROLOGY

## 2023-09-13 NOTE — PROGRESS NOTES
Patient ID: Jeremias Low is a 70 y.o. male. Assessment/Plan:    78-year-old male who is here as a patient establish care with us. Patient has a history of chronic lacunar infarcts. Etiology is most concerning for small vessel disease. He does have risk factors such as hypertension, hyperlipidemia and likely obstructive sleep apnea. Workup  MRI Brain 9/1/2023 No acute intracranial abnormality. Unchanged multifocal chronic lacunar infarcts in bilateral caudate, bilateral corona radiata, bilateral thalamus, and right cerebellum with mild chronic microangiopathy. Unchanged 2.2 cm lobulated mass in deep aspect of right parotid gland, stable since 11/9/2014. 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 Consider follow-up with ENT. PLAN:      Diagnoses and all orders for this visit:    Snoring  -refer patient to sleep medicine.   -     Ambulatory Referral to Sleep Medicine; Future    Ischemic changes on head CT  -     Ambulatory Referral to Neurology    Left facial numbness  -     Ambulatory Referral to Neurology    History of CVA (cerebrovascular accident)  Etiology of the CVA -   -for stroke prevention continue with combination of aspirin and atorvastatin  -BP goal < 130/80, BP is at goal in the office. -LDL goal <70  -I advised patient to avoid using NSAIDs for headaches or other pain and to stick to tylenol if needed  -Recommend mediterranean diet & regular exercise regimen atleast 4-5 times a week for 20-30 minutes. -I educated patient/family regarding medication compliance    Follow up - 3 months    I would be happy to see the patient sooner if any new questions/concerns arise. Patient/Guardian was advised to the call the office if they have any questions and concerns in the meantime.      Patient/Guardian does understand that if they have any new stroke like symptoms such as facial droop on one side, weakness/paralysis on either side, speech trouble, numbness on one side, balance issues, any vision changes, extreme dizziness or any new headache, to call 9-1-1 immediately or to proceed to the nearest ER immediately. Subjective:    HPI    This is a 70year old male with history of HLD, HTN, CAD, tobacco use was referred to neurology for episode of L facial numbness and ischemic changes on head CT. History provided by patient and daughter. Patient reports in July he was sick with fever, chills, fatigue for a few days then one day while he was sitting after eating, all of the sudden he felt L sided headache, numbness, diffuse weakness throughout his body and fatigue. This lasted for around an hour then went away. Afterwards he still had left sided facial weakness, difficulty with speech and moving his tongue, and drooling. He does not remember if his upper face or eyebrow were involved and is not sure if he was able to close his eye normally. He did not see a doctor as he was traveling to [place]. When he retuned 2 weeks later, his daughter noticed he still had some left sided facial droop but it was improved. Patient denies any other visual changes, dizziness, headache, focal weakness or numbness in other areas of his body. Currently he feels there is a painful “hammer” pounding sensation in the area in front of and directly above his left ear that has been constant since this episode. He has baseline hearing loss on left side due to some ear drum surgery in the 1970s. Daughter is not sure if he has seen ENT recently. No prior history of strokes he was aware of. Patient smokes 1 pack of cigarettes per day since he was 7years old. He has a history of snoring but has never been evaluated with a sleep study. MRI Brain 9/1/2023 No acute intracranial abnormality.   Unchanged multifocal chronic lacunar infarcts in bilateral caudate, bilateral corona radiata, bilateral thalamus, and right cerebellum with mild chronic microangiopathy. Unchanged 2.2 cm lobulated mass in deep aspect of right parotid gland, stable since 11/9/2014. 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 Consider follow-up with ENT. The following portions of the patient's history were reviewed and updated as appropriate:   He  has a past medical history of Hyperlipidemia and Hypertension. He   Patient Active Problem List    Diagnosis Date Noted   • History of CVA (cerebrovascular accident) 09/15/2023   • Snoring 09/13/2023   • Left facial numbness 08/24/2023   • Ischemic changes on head CT 08/24/2023   • Facial paralysis 07/27/2023   • Mass of right hand 05/26/2021   • Tobacco use 04/16/2020   • Coronary artery disease without angina pectoris 04/16/2020   • Essential hypertension 03/04/2020   • Mixed hyperlipidemia 03/04/2020   • Lyme disease 02/03/2020   • Acute left-sided thoracic back pain 12/26/2019   • Chronic neck pain 12/26/2019   • Arthralgia of both hands 12/26/2019   • Cyst of epididymis 12/07/2018     He  has a past surgical history that includes Leg Surgery (Left). His family history includes Thyroid cancer in his brother. He  reports that he has been smoking cigarettes. He has quit using smokeless tobacco. He reports that he does not drink alcohol and does not use drugs. Current Outpatient Medications   Medication Sig Dispense Refill   • amLODIPine (NORVASC) 10 mg tablet TAKE 1 TABLET BY MOUTH EVERY DAY 90 tablet 1   • aspirin 81 MG tablet Take 1 tablet by mouth daily     • atorvastatin (LIPITOR) 80 mg tablet Take 1 tablet (80 mg total) by mouth daily 90 tablet 3   • losartan (COZAAR) 25 mg tablet Take 1 tablet (25 mg total) by mouth daily (Patient taking differently: Take 50 mg by mouth daily) 90 tablet 3   • vitamin B-12 (CYANOCOBALAMIN) 2000 MCG TABS Take 1 tablet by mouth daily       No current facility-administered medications for this visit. Current Outpatient Medications on File Prior to Visit   Medication Sig   • amLODIPine (NORVASC) 10 mg tablet TAKE 1 TABLET BY MOUTH EVERY DAY   • aspirin 81 MG tablet Take 1 tablet by mouth daily   • atorvastatin (LIPITOR) 80 mg tablet Take 1 tablet (80 mg total) by mouth daily   • losartan (COZAAR) 25 mg tablet Take 1 tablet (25 mg total) by mouth daily (Patient taking differently: Take 50 mg by mouth daily)   • vitamin B-12 (CYANOCOBALAMIN) 2000 MCG TABS Take 1 tablet by mouth daily     No current facility-administered medications on file prior to visit. He has No Known Allergies. .      Objective:    Blood pressure 130/80, pulse 86, height 5' 2" (1.575 m), weight 74.6 kg (164 lb 6.4 oz). Physical Exam  General: Patient is not in any acute/apparent distress, well nourished, well developed and cooperative. HEENT: normocephalic, atraumatic, moist membranes  Neck: supple    Neurologic Examination:   Mental status: alert, awake, oriented X 3 and following commands. Speech/Language: Speech is fluent without any dysarthria, no aphasia noted, can name, repeat, read and write and comprehension intact    Cranial Nerves:   CN I: smell not tested  CN II: Visual fields full to confrontation, fundus - no papilledema noted. CN III, IV, VI: Extraocular movements intact bilaterally. Pupils equal round and reactive to light bilaterally. CN V: Facial sensation is normal.  CN VII: Full and symmetric facial movement. CN VIII: Hearing is normal.  CN IX, X: Palate elevates symmetrically. Normal gag reflex. CN XI: Shoulder shrug strength is normal.  CN XII: Tongue midline without atrophy or fasciculations. Motor:   Strength 5/5 in all 4 extremities. No pronator drift. Normal rapid alternating movements. Bulk/tone - normal.  Fasiculations - none    Sensory:   Sensation intact to soft touch, vibration and pinprick in all 4 extremities. Cerebellar:   Finger-to-nose intact, normal heel to shin.     Reflexes: 2+ in all 4 extremities  Pathologic reflexes - babinski reflex negative, Hoffmans reflex - negative    Gait:   Normal gait, able to tandem walk, able to tip-toe, able to walk on heels, normal arm to swing. Rhomberg sign negative    ROS:  Reviewed ROS   Review of Systems   Constitutional: Positive for fatigue. Negative for appetite change and fever. HENT: Positive for tinnitus. Negative for hearing loss, trouble swallowing and voice change. Eyes: Negative. Negative for photophobia, pain and visual disturbance. Respiratory: Negative. Negative for shortness of breath. Cardiovascular: Negative. Negative for palpitations. Gastrointestinal: Negative. Negative for nausea and vomiting. Endocrine: Negative. Negative for cold intolerance. Genitourinary: Negative. Negative for dysuria, frequency and urgency. Musculoskeletal: Positive for neck pain. Negative for back pain, gait problem and myalgias. Skin: Negative. Negative for rash. Allergic/Immunologic: Negative. Neurological: Positive for tremors and speech difficulty. Negative for dizziness, seizures, syncope, facial asymmetry, weakness, light-headedness, numbness and headaches. Hematological: Negative. Does not bruise/bleed easily. Psychiatric/Behavioral: Positive for confusion. Negative for hallucinations and sleep disturbance.

## 2023-09-14 ENCOUNTER — OFFICE VISIT (OUTPATIENT)
Dept: CARDIOLOGY CLINIC | Facility: CLINIC | Age: 63
End: 2023-09-14
Payer: MEDICARE

## 2023-09-14 VITALS
DIASTOLIC BLOOD PRESSURE: 84 MMHG | HEIGHT: 62 IN | SYSTOLIC BLOOD PRESSURE: 122 MMHG | WEIGHT: 164 LBS | BODY MASS INDEX: 30.18 KG/M2 | OXYGEN SATURATION: 97 % | HEART RATE: 86 BPM

## 2023-09-14 DIAGNOSIS — E78.2 MIXED HYPERLIPIDEMIA: ICD-10-CM

## 2023-09-14 DIAGNOSIS — I25.10 CORONARY ARTERY DISEASE WITHOUT ANGINA PECTORIS, UNSPECIFIED VESSEL OR LESION TYPE, UNSPECIFIED WHETHER NATIVE OR TRANSPLANTED HEART: Primary | ICD-10-CM

## 2023-09-14 DIAGNOSIS — I10 ESSENTIAL HYPERTENSION: ICD-10-CM

## 2023-09-14 DIAGNOSIS — E66.9 OBESITY (BMI 30-39.9): ICD-10-CM

## 2023-09-14 DIAGNOSIS — Z72.0 TOBACCO USE: ICD-10-CM

## 2023-09-14 PROCEDURE — 99214 OFFICE O/P EST MOD 30 MIN: CPT | Performed by: INTERNAL MEDICINE

## 2023-09-14 NOTE — PROGRESS NOTES
Cardiology Office Note  MD Justo Hahn MD Helen Right, DO, MD Rhys Estrada DO, Bautista Rice DO, [facility]  ----------------------------------------------------------------  [facility]  [address] [facility], [address] 70 y.o. male MRN: [id number]  Unit/Bed#:  Encounter: [id number]      History of Present Illness: It was a please to see Pooja Setting in the office today for follow-up CV evaluation. He is here today following an episode of chest discomfort that he had at [facility] with elevated troponin. Subsequently, he was discharged to home and evaluated in the office. His symptoms were concerning for angina with a midsternal chest tightness going into his shoulder. He previously had a stress test in the past that was found to be negative for myocardial ischemia. Based on his recurring symptoms concerning for angina, he was sent for cardiac catheterization. Cardiac catheterization demonstrated nonobstructive coronary artery disease. Following the catheterization on May 14, 2020, he was discharged to home. Since our encounter in June 2020, he has had elevated blood pressure discomfort and presented to the emergency department at [facility] in late December 2020. He was found to have significantly elevated pressure in was subsequently increased on amlodipine and started on losartan 25 mg daily. His blood pressure had previously been elevated and he was increased to amlodipine 10 mg daily. At some point in the past, he took himself off of the losartan medication,  but denies adverse effects on the medication. He has been taking his medications without any reported adverse effects. He has also been taking high-intensity statin. Patient began to experience numbness in his face in July 2023. He has undergone work-up including MRI of the brain and evaluation by neurology.   No evidence of findings on the MRI consistent with embolic disease, but did show chronic lacunar infarcts. Denies chest pain, pressure, tightness or squeezing. Denies lightheadedness, dizziness or palpitations. Denies lower extremity swelling, orthopnea or paroxysmal nocturnal dyspnea. He admits to being fairly active at home and climbs stairs without any exertional symptoms. Review of Systems:  Review of Systems   Constitutional: Negative for decreased appetite, fever, weight gain and weight loss. HENT: Negative for congestion and sore throat. Eyes: Negative for visual disturbance. Cardiovascular: Negative for chest pain, dyspnea on exertion, leg swelling, near-syncope and palpitations. Respiratory: Negative for cough and shortness of breath. Hematologic/Lymphatic: Negative for bleeding problem. Skin: Negative for rash. Musculoskeletal: Negative for back pain, myalgias and neck pain. Gastrointestinal: Negative for abdominal pain and nausea. Neurological: Negative for light-headedness and weakness. Psychiatric/Behavioral: Negative for depression.        Past Medical History:   Diagnosis Date   • Hyperlipidemia    • Hypertension        Past Surgical History:   Procedure Laterality Date   • LEG SURGERY Left        Social History     Family History   Problem Relation Age of Onset   • Thyroid cancer Brother        No Known Allergies      Current Outpatient Medications:   •  amLODIPine (NORVASC) 10 mg tablet, TAKE 1 TABLET BY MOUTH EVERY DAY, Disp: 90 tablet, Rfl: 1  •  aspirin 81 MG tablet, Take 1 tablet by mouth daily, Disp: , Rfl:   •  atorvastatin (LIPITOR) 80 mg tablet, Take 1 tablet (80 mg total) by mouth daily, Disp: 90 tablet, Rfl: 3  •  vitamin B-12 (CYANOCOBALAMIN) 2000 MCG TABS, Take 1 tablet by mouth daily, Disp: , Rfl:   •  losartan (COZAAR) 25 mg tablet, Take 1 tablet (25 mg total) by mouth daily (Patient taking differently: Take 50 mg by mouth daily), Disp: 90 tablet, Rfl: 3    Vitals: 23 0801   BP: 122/84   BP Location: Left arm   Patient Position: Sitting   Cuff Size: Standard   Pulse: 86   SpO2: 97%   Weight: 74.4 kg (164 lb)   Height: 5' 2" (1.575 m)       PHYSICAL EXAMINATION:  Gen: Awake, Alert, NAD  Head/eyes: AT/NC, pupils equal and round, Anicteric  ENT: mmm  Neck: Supple, No elevated JVP, trachea midline  Resp: CTA bilaterally no w/r/r  CV: RRR +S1, S2, No m/r/g  Abd: Soft, NT/ND + BS  Ext: no LE edema bilaterally  Neuro: Follows commands, moves all extermities  Psych: Appropriate affect, normal mood, cooperative attitude, non-combative  Skin: warm; no rash, erythema or venous stasis changes on exposed skin    --------------------------------------------------------------------------------    CATH:      Results for orders placed during the hospital encounter of 20   Cardiac catheterization    N[facility]ative [id number] [facility]  [address]  [phone]    [facility]    Invasive Cardiovascular Lab Complete Report    Patient: Arch Simmonds  MR number: [id number]  Account number: [de-identified]  Study date: 2020  Gender: Male  : 1960  Height: 61.8 in  Weight: 150.7 lb  BSA: 1.69 mï¾²    Allergies: NO KNOWN ALLERGIES    Diagnostic Cardiologist:  Pedro Darling MD  Primary Physician:  DO JEREMIE Mayo    CORONARY CIRCULATION:  Left main: Normal.  LAD: There was minor plaque of the distal vessel. The major D1 branch was normal. There was minor plaque of the small D2 branch. Circumflex: Normal. The major OM branch was also normal.  RCA: The vessel was normal sized and dominant, giving rise to the PDA and two posterolateral branches. There was mild non-obstructive plaque. REPORT ELEMENT SELECTION:  Right radial access was employed. There were no complications. Summary:  Non-obstructive plaque. Plan: follow-up with cardiology. INDICATIONS:  --  Possible CAD: atypical chest pain.     PROCEDURES PERFORMED    --  Left coronary angiography. --  Right coronary angiography. --  Outpatient. --  Mod Sedation Same Physician Initial 15min. --  Coronary Catheterization (w/o [facility]). PROCEDURE: The risks and alternatives of the procedures and conscious sedation were explained to the patient and informed consent was obtained. The patient was brought to the cath lab and placed on the table. The planned puncture sites  were prepped and draped in the usual sterile fashion. --  Right radial artery access. After performing an Loyd's test to verify adequate ulnar artery supply to the hand, the radial site was prepped. The puncture site was infiltrated with local anesthetic. The vessel was accessed using the  modified Seldinger technique, a wire was advanced into the vessel, and a sheath was advanced over the wire into the vessel. --  Left coronary artery angiography. A catheter was advanced over a guidewire into the aorta and positioned in the left coronary artery ostium under fluoroscopic guidance. Angiography was performed. --  Right coronary artery angiography. A catheter was advanced over a guidewire into the aorta and positioned in the right coronary artery ostium under fluoroscopic guidance. Angiography was performed. --  Outpatient. --  Mod Sedation Same Physician Initial 15min. --  Coronary Catheterization (w/o [facility]). PROCEDURE COMPLETION: The patient tolerated the procedure well and was discharged from the cath lab. TIMING: Test started at 09:45. Test concluded at 09:57. HEMOSTASIS: The sheath was removed. The site was compressed with a Hemoband  device. Hemostasis was obtained. MEDICATIONS GIVEN: Verapamil (Isoptin, Calan, Covera), 1.25 mg, IA, at 09:51. Versed (2mg/2ml), 2 mg, IV, at 09:45. Fentanyl (1OOmcg/2 ml), 50 mcg, IV, at 09:45. 1% Lidocaine, 0.1 ml, subcutaneously, at  09:48. Heparin 1000 units/ml, 4,000 units, IV, at 09:50. Nitroglycerin (200mcg/ml), 200 mcg, at 09:51.  CONTRAST GIVEN: 30 ml Omnipaque (350mg I /ml). RADIATION EXPOSURE: Fluoroscopy time: 1.48 min. CORONARY VESSELS:   --  Left main: Normal.  --  LAD: There was minor plaque of the distal vessel. The major D1 branch was normal. There was minor plaque of the small D2 branch. --  Circumflex: Normal. The major OM branch was also normal.  --  RCA: The vessel was normal sized and dominant, giving rise to the PDA and two posterolateral branches. There was mild non-obstructive plaque. NOTE: Right radial access was employed. There were no complications. Prepared and signed by    Rylee Mondragon MD  Signed 2020 10:55:01    CC: Dr. Basilia Kirkland    Study diagram    Hemodynamic tables    Pressures:  Baseline  Pressures:  - HR: 77  Pressures:  - Rhythm:  Pressures:  -- Aortic Pressure (S/D/M): 118/69/68    Outputs:  Baseline  Outputs:  -- CALCULATIONS: Age in years: 59.85  Outputs:  -- CALCULATIONS: Body Surface Area: 1.69  Outputs:  -- CALCULATIONS: Height in cm: 157.00  Outputs:  -- CALCULATIONS: Sex: Male  Outputs:  -- CALCULATIONS: Weight in k.50         --------------------------------------------------------------------------------    ECGs:  No results found for this visit on 23.      Lab Results   Component Value Date    WBC 7.98 2023    HGB 15.0 2023    HCT 48.0 2023    MCV 86 2023     2023      Lab Results   Component Value Date    SODIUM 142 2023    K 4.7 2023     (H) 2023    CO2 27 2023    BUN 15 2023    CREATININE 0.89 2023    GLUC 85 2023    CALCIUM 9.0 2023      Lab Results   Component Value Date    HGBA1C 6.1 (H) 2020      Lab Results   Component Value Date    CHOL 230 (H) 2017    CHOL 264 (H) 10/26/2016    CHOL 282 (H) 2016     Lab Results   Component Value Date    HDL 23 (L) 2023    HDL 23 (L) 2020    HDL 35 (L) 2019     Lab Results   Component Value Date    LDLCALC 140 (H) 07/28/2023    LDLCALC 123 (H) 05/14/2020    LDLCALC 222 (H) 12/28/2019     Lab Results   Component Value Date    TRIG 318 (H) 07/28/2023    TRIG 183 (H) 05/14/2020    TRIG 121 12/28/2019         1. Coronary artery disease without angina pectoris, unspecified vessel or lesion type, unspecified whether native or transplanted heart    2. Essential hypertension    3. Mixed hyperlipidemia    4. Tobacco use    5. Obesity (BMI 30-39. 9)        IMPRESSION:  · CAD   · CP s/p cath w/ minimal plaque of the LAD, D2 and RCA, May 2020  · Pharmacologic nuclear stress test negative for myocardial ischemia, April 2020  · Hypertension  · LVEF 55%, AV sclerosis, MAC, trace MR, April 2020  · Dyslipidemia w/  mg/dL and non- mg/dL, December 2020  · History of Lyme disease s/p completion of doxycycline  · Obesity  · Prediabetes  · Tobacco abuse  · History of chronic lacunar infarcts by MRI brain, September 2023  · Family history of CAD with mother    PLAN:  It was a pleasure to see Corina Smith in the office today for follow-up evaluation. He is here today for routine CV follow-up. Admitted to some numbness in the face for which she is currently undergoing work-up with neurology. He does not report being told that there was any evidence of embolic disease or underlying cardiac condition related to his symptoms by neurology. He has no chest pain concerning for angina and no signs or symptoms of heart failure. Clinically he examines to be euvolemic. Blood pressure and heart rate are currently well controlled. He is tolerating his current medications without any reported adverse effects. The patient can perform greater than 4 METS on daily basis without significant exertional symptoms. Based on his clinical presentation, I have the following recommendations:    1. Recommend 30 minutes a day, 5 days a week of moderate intensity activity to build cardiovascular endurance.   2.  Would encourage heart healthy diet low in sodium and carbohydrate. 3.  Patient concerned about stopping aspirin. Would recommend reaching out to neurology or primary care with history of lacunar infarcts before discontinuing. 4.  Continue high intensity statin. Goal LDL is less than 70 mg/dL. Repeat lipid panel in 6 months. 5.  Continue current antihypertensive regimen including amlodipine and losartan. 6.  No additional CV testing at this time  7. We will follow-up with him in 1 year to reassess his progress. As always, please not hesitate to call with any questions. Portions of the record may have been created with voice recognition software. Occasional wrong word or "sound a like" substitutions may have occurred due to the inherent limitations of voice recognition software. Read the chart carefully and recognize, using context, where substitutions have occurred.       Signed: Karol Paulino DO, [facility] Primus

## 2023-09-15 PROBLEM — Z86.73 HISTORY OF CVA (CEREBROVASCULAR ACCIDENT): Status: ACTIVE | Noted: 2023-09-15

## 2023-10-13 NOTE — PROGRESS NOTES
Cardiology Office Note  MD Berry Ward MD Diannia Edis, DO, MD John Vanegas DO, Mike Huang DO, [facility] [place]  ----------------------------------------------------------------  [address] Pr[place] Saray Pierre 57 y o  male MRN: [id number]  Unit/Bed#:  Encounter: [id number]      History of Present Illness: It was a please to see Eun Arora in the office today for follow-up CV evaluation  He is here today following an episode of chest discomfort that he had at [facility] with elevated troponin  Subsequently, he was discharged to home and evaluated in the office  His symptoms were concerning for angina with a midsternal chest tightness going into his shoulder  He previously had a stress test in the past that was found to be negative for myocardial ischemia  Based on his recurring symptoms concerning for angina, he was sent for cardiac catheterization  Cardiac catheterization demonstrated nonobstructive coronary artery disease  Following the catheterization on May 14, 2020, he was discharged to home  Since our encounter in June 2020, he has had elevated blood pressure discomfort and presented to the emergency department at [facility] in late December 2020  He was found to have significantly elevated pressure in was subsequently increased on amlodipine and started on losartan 25 mg daily  His blood pressure had previously been elevated and he was increased to amlodipine 10 mg daily  At some point in the past, he took himself off of the losartan medication,  but denies adverse effects on the medication  He has been taking his medications without any reported adverse effects  He has also been taking high-intensity statin  He never obtained any repeat blood work  Denies chest pain, pressure, tightness or squeezing  Denies lightheadedness, dizziness or palpitations    Denies Her initial blood pressure somewhat elevated here today.  We will recheck it 1 more time.  Upon recheck her blood pressure is better but still little too high.  She was only taking her lisinopril once a day.  She will start taking the lisinopril 20 mg twice daily along with the amlodipine 5 mg once a day.   lower extremity swelling, orthopnea or paroxysmal nocturnal dyspnea  He admits to being fairly active at home and at work and climbs stairs without any exertional symptoms  Review of Systems:  Review of Systems   Constitutional: Negative for decreased appetite, fever, weight gain and weight loss  HENT: Negative for congestion and sore throat  Eyes: Negative for visual disturbance  Cardiovascular: Negative for chest pain, dyspnea on exertion, leg swelling, near-syncope and palpitations  Respiratory: Negative for cough and shortness of breath  Hematologic/Lymphatic: Negative for bleeding problem  Skin: Negative for rash  Musculoskeletal: Negative for back pain, myalgias and neck pain  Gastrointestinal: Negative for abdominal pain and nausea  Neurological: Negative for light-headedness and weakness  Psychiatric/Behavioral: Negative for depression         Past Medical History:   Diagnosis Date   • Hyperlipidemia    • Hypertension        Past Surgical History:   Procedure Laterality Date   • LEG SURGERY Left        Social History     Family History   Problem Relation Age of Onset   • Thyroid cancer Brother        No Known Allergies      Current Outpatient Medications:   •  amLODIPine (NORVASC) 10 mg tablet, Take 1 tablet (10 mg total) by mouth daily, Disp: 30 tablet, Rfl: 0  •  aspirin 81 MG tablet, Take 1 tablet by mouth daily, Disp: , Rfl:   •  atorvastatin (LIPITOR) 80 mg tablet, Take 1 tablet (80 mg total) by mouth daily, Disp: 90 tablet, Rfl: 3  •  losartan (COZAAR) 25 mg tablet, Take 1 tablet (25 mg total) by mouth daily, Disp: 90 tablet, Rfl: 3  •  vitamin B-12 (CYANOCOBALAMIN) 2000 MCG TABS, Take 1 tablet by mouth daily, Disp: , Rfl:     Vitals:    03/08/23 0943   BP: 140/80   BP Location: Right arm   Patient Position: Sitting   Cuff Size: Adult   Pulse: 91   Weight: 77 7 kg (171 lb 6 4 oz)       PHYSICAL EXAMINATION:  Gen: Awake, Alert, NAD  Head/eyes: AT/NC, pupils equal and round, Anicteric  ENT: mmm  Neck: Supple, No elevated JVP, trachea midline  Resp: CTA bilaterally no w/r/r  CV: RRR +S1, S2, No m/r/g  Abd: Soft, NT/ND + BS  Ext: no LE edema bilaterally  Neuro: Follows commands, moves all extermities  Psych: Appropriate affect, pleasant mood, pleasant attitude, non-combative  Skin: warm; no rash, erythema or venous stasis changes on exposed skin    --------------------------------------------------------------------------------    CATH:      Results for orders placed during the hospital encounter of 20   Cardiac catheterization    Narrative [address]  [address]  [phone]    [facility]    Invasive Cardiovascular Lab Complete Report    Patient: Lizzy Mcintyre  MR number: [id number]  Account number: [de-identified]  Study date: 2020  Gender: Male  : 1960  Height: 61 8 in  Weight: 150 7 lb  BSA: 1 69 mï¾²    Allergies: NO KNOWN ALLERGIES    Diagnostic Cardiologist:  Chalo Palencia MD  Primary Physician:  DO JEREMIE Unger    CORONARY CIRCULATION:  Left main: Normal   LAD: There was minor plaque of the distal vessel  The major D1 branch was normal  There was minor plaque of the small D2 branch  Circumflex: Normal  The major OM branch was also normal   RCA: The vessel was normal sized and dominant, giving rise to the PDA and two posterolateral branches  There was mild non-obstructive plaque  REPORT ELEMENT SELECTION:  Right radial access was employed  There were no complications  Summary:  Non-obstructive plaque  Plan: follow-up with cardiology  INDICATIONS:  --  Possible CAD: atypical chest pain  PROCEDURES PERFORMED    --  Left coronary angiography  --  Right coronary angiography  --  Outpatient  --  Mod Sedation Same Physician Initial 15min  --  Coronary Catheterization (w/o LHC)      PROCEDURE: The risks and alternatives of the procedures and conscious sedation were explained to the patient and informed consent was obtained  The patient was brought to the cath lab and placed on the table  The planned puncture sites  were prepped and draped in the usual sterile fashion  --  Right radial artery access  After performing an Loyd's test to verify adequate ulnar artery supply to the hand, the radial site was prepped  The puncture site was infiltrated with local anesthetic  The vessel was accessed using the  modified Seldinger technique, a wire was advanced into the vessel, and a sheath was advanced over the wire into the vessel  --  Left coronary artery angiography  A catheter was advanced over a guidewire into the aorta and positioned in the left coronary artery ostium under fluoroscopic guidance  Angiography was performed  --  Right coronary artery angiography  A catheter was advanced over a guidewire into the aorta and positioned in the right coronary artery ostium under fluoroscopic guidance  Angiography was performed  --  Outpatient  --  Mod Sedation Same Physician Initial 15min  --  Coronary Catheterization (w/o [facility])  PROCEDURE COMPLETION: The patient tolerated the procedure well and was discharged from the cath lab  TIMING: Test started at 09:45  Test concluded at 09:57  HEMOSTASIS: The sheath was removed  The site was compressed with a Hemoband  device  Hemostasis was obtained  MEDICATIONS GIVEN: Verapamil (Isoptin, Calan, Covera), 1 25 mg, IA, at 09:51  Versed (2mg/2ml), 2 mg, IV, at 09:45  Fentanyl (1OOmcg/2 ml), 50 mcg, IV, at 09:45  1% Lidocaine, 0 1 ml, subcutaneously, at  09:48  Heparin 1000 units/ml, 4,000 units, IV, at 09:50  Nitroglycerin (200mcg/ml), 200 mcg, at 09:51  CONTRAST GIVEN: 30 ml Omnipaque (350mg I /ml)  RADIATION EXPOSURE: Fluoroscopy time: 1 48 min  CORONARY VESSELS:   --  Left main: Normal   --  LAD: There was minor plaque of the distal vessel  The major D1 branch was normal  There was minor plaque of the small D2 branch    -- Circumflex: Normal  The major OM branch was also normal   --  RCA: The vessel was normal sized and dominant, giving rise to the PDA and two posterolateral branches  There was mild non-obstructive plaque  NOTE: Right radial access was employed  There were no complications  Prepared and signed by    Perry Calixto MD  Signed 2020 10:55:01    CC: Dr Mariano Cuevas    Study diagram    Hemodynamic tables    Pressures:  Baseline  Pressures:  - HR: 77  Pressures:  - Rhythm:  Pressures:  -- Aortic Pressure (S/D/M): 118/69/68    Outputs:  Baseline  Outputs:  -- CALCULATIONS: [age]  Outputs:  -- CALCULATIONS: Body Surface Area: 1 69  Outputs:  -- CALCULATIONS: Height in cm: 157 00  Outputs:  -- CALCULATIONS: Sex: Male  Outputs:  -- CALCULATIONS: Weight in k 50         --------------------------------------------------------------------------------    ECGs:  No results found for this visit on 23  Lab Results   Component Value Date    WBC 11 07 (H) 2020    HGB 15 9 2020    HCT 51 3 (H) 2020    MCV 88 2020     2020      Lab Results   Component Value Date    SODIUM 140 2020    K 4 8 2020     2020    CO2 28 2020    BUN 16 2020    CREATININE 0 94 2020    CALCIUM 9 5 2020      Lab Results   Component Value Date    HGBA1C 6 1 (H) 2020      Lab Results   Component Value Date    CHOL 230 (H) 2017    CHOL 264 (H) 10/26/2016    CHOL 282 (H) 2016     Lab Results   Component Value Date    HDL 23 (L) 2020    HDL 35 (L) 2019    HDL 41 2017     Lab Results   Component Value Date    LDLCALC 123 (H) 2020    LDLCALC 222 (H) 2019     Lab Results   Component Value Date    TRIG 183 (H) 2020    TRIG 121 2019    TRIG 124 2017         1   Coronary artery disease without angina pectoris, unspecified vessel or lesion type, unspecified whether native or transplanted heart    2  Essential hypertension  -     POCT ECG  -     losartan (COZAAR) 25 mg tablet; Take 1 tablet (25 mg total) by mouth daily  -     Basic metabolic panel    3  Mixed hyperlipidemia  -     Lipid Panel with Direct LDL reflex; Future    4  Tobacco use    5  Hyperlipidemia, unspecified hyperlipidemia type  -     atorvastatin (LIPITOR) 80 mg tablet; Take 1 tablet (80 mg total) by mouth daily        IMPRESSION:  · CAD   · CP s/p cath w/ minimal plaque of the LAD, D2 and RCA, May 2020  · Pharmacologic nuclear stress test negative for myocardial ischemia, April 2020  · Hypertension  · LVEF 55%, AV sclerosis, MAC, trace MR, April 2020  · Dyslipidemia w/  mg/dL and non- mg/dL, December 2020  · History of Lyme disease s/p completion of doxycycline  · Overweight  · Prediabetes  · Tobacco abuse  · Family history of CAD with mother    PLAN:  It was a pleasure to see Zaire Hsu in the office today for follow-up evaluation  He is here today for routine CV follow-up  Since our last encounter, he has been taking his amlodipine medication, but at some point came off of his losartan medication in the past   Blood pressure is mildly elevated in the office today  He has no symptoms concerning for angina and no signs or symptoms of heart failure  He examines to be euvolemic in the office today  Heart rate is currently stable  He is tolerating his current medications without any reported adverse effects  ECG is unchanged from prior without acute ischemic changes  Based on his clinical presentation, I have the following recommendations:    1  Recommend starting losartan 25 mg daily with BMP in 1 week  Risks and benefits of medication discussed  Continue amlodipine 10 mg daily  2   Continue high intensity statin  Goal LDL is less than 70 mg/dL  Repeat lipid panel has been ordered along with BMP  3   Continue aspirin therapy  4    Recommend 30 minutes a day, 5 days a week of moderate intensity activity to build cardiovascular endurance  5  Recommend heart healthy diet low in sodium and carbohydrate  6  No additional CV testing at this time  7  We will follow-up with him in 2 weeks for nursing blood pressure check and in 1 year  As always, please not hesitate to call with any questions  Portions of the record may have been created with voice recognition software  Occasional wrong word or "sound a like" substitutions may have occurred due to the inherent limitations of voice recognition software  Read the chart carefully and recognize, using context, where substitutions have occurred        Signed: Elsie Barbosa DO, Loreen Ligas

## 2024-03-26 DIAGNOSIS — I10 ESSENTIAL HYPERTENSION: ICD-10-CM

## 2024-03-26 DIAGNOSIS — E78.5 HYPERLIPIDEMIA, UNSPECIFIED HYPERLIPIDEMIA TYPE: ICD-10-CM

## 2024-03-26 RX ORDER — ATORVASTATIN CALCIUM 80 MG/1
80 TABLET, FILM COATED ORAL DAILY
Qty: 90 TABLET | Refills: 3 | Status: SHIPPED | OUTPATIENT
Start: 2024-03-26

## 2024-03-26 RX ORDER — LOSARTAN POTASSIUM 25 MG/1
25 TABLET ORAL DAILY
Qty: 90 TABLET | Refills: 3 | Status: SHIPPED | OUTPATIENT
Start: 2024-03-26

## 2024-06-20 ENCOUNTER — TELEPHONE (OUTPATIENT)
Dept: FAMILY MEDICINE CLINIC | Facility: CLINIC | Age: 64
End: 2024-06-20

## 2024-06-25 NOTE — TELEPHONE ENCOUNTER
06/25/24 4:00 PM        The office's request has been received, reviewed, and the patient chart updated. The PCP has successfully been removed with a patient attribution note. This message will now be completed.        Thank you  Humaira Rosenbaum